# Patient Record
Sex: FEMALE | Race: BLACK OR AFRICAN AMERICAN | Employment: UNEMPLOYED | RURAL
[De-identification: names, ages, dates, MRNs, and addresses within clinical notes are randomized per-mention and may not be internally consistent; named-entity substitution may affect disease eponyms.]

---

## 2017-03-09 DIAGNOSIS — I10 ESSENTIAL HYPERTENSION, BENIGN: ICD-10-CM

## 2017-03-09 RX ORDER — LISINOPRIL AND HYDROCHLOROTHIAZIDE 12.5; 2 MG/1; MG/1
TABLET ORAL
Qty: 60 TAB | Refills: 0 | Status: SHIPPED | OUTPATIENT
Start: 2017-03-09 | End: 2017-04-25 | Stop reason: SDUPTHER

## 2017-03-09 NOTE — LETTER
3/9/2017 1:41 PM 
 
Ms. Flavio Elena [address] [facility] [address] [facility] [address] Dear Ms. Harsha: 
 
We've missed you! Please call our office at [phone] and schedule a follow up appointment for your continued care.  
 
 
 
Sincerely, 
 
 
Sandie Montano NP

## 2017-04-25 ENCOUNTER — OFFICE VISIT (OUTPATIENT)
Dept: FAMILY MEDICINE CLINIC | Age: 67
End: 2017-04-25

## 2017-04-25 VITALS
RESPIRATION RATE: 20 BRPM | DIASTOLIC BLOOD PRESSURE: 87 MMHG | TEMPERATURE: 97.8 F | HEART RATE: 68 BPM | SYSTOLIC BLOOD PRESSURE: 170 MMHG | BODY MASS INDEX: 30.02 KG/M2 | WEIGHT: 159 LBS | HEIGHT: 61 IN

## 2017-04-25 DIAGNOSIS — F41.8 DEPRESSION WITH ANXIETY: Primary | ICD-10-CM

## 2017-04-25 DIAGNOSIS — I10 ESSENTIAL HYPERTENSION, BENIGN: ICD-10-CM

## 2017-04-25 DIAGNOSIS — F20.3 UNDIFFERENTIATED SCHIZOPHRENIA (HCC): ICD-10-CM

## 2017-04-25 DIAGNOSIS — Z00.00 HEALTH CARE MAINTENANCE: ICD-10-CM

## 2017-04-25 RX ORDER — QUETIAPINE FUMARATE 200 MG/1
TABLET, FILM COATED ORAL
Qty: 75 TAB | Refills: 1 | Status: SHIPPED | OUTPATIENT
Start: 2017-04-25 | End: 2017-05-26 | Stop reason: SDUPTHER

## 2017-04-25 RX ORDER — AMLODIPINE BESYLATE 5 MG/1
5 TABLET ORAL DAILY
Qty: 30 TAB | Refills: 0 | Status: CANCELLED | OUTPATIENT
Start: 2017-04-25

## 2017-04-25 RX ORDER — LISINOPRIL AND HYDROCHLOROTHIAZIDE 12.5; 2 MG/1; MG/1
2 TABLET ORAL DAILY
Qty: 60 TAB | Refills: 0 | Status: SHIPPED | OUTPATIENT
Start: 2017-04-25 | End: 2018-09-25 | Stop reason: SDUPTHER

## 2017-04-25 NOTE — MR AVS SNAPSHOT
Visit Information Date & Time Provider Department Dept. Phone Encounter #  
 4/25/2017  3:40 PM Ursula Can MD [address] [place] [address] [id number] Follow-up Instructions Return for -Due for a Medicare Wellness Visit. Upcoming Health Maintenance Date Due Hepatitis C Screening 1950 ZOSTER VACCINE AGE 60> 6/14/2010 GLAUCOMA SCREENING Q2Y 6/14/2015 OSTEOPOROSIS SCREENING (DEXA) 6/14/2015 Pneumococcal 65+ Low/Medium Risk (1 of 2 - PCV13) 6/14/2015 FOBT Q 1 YEAR AGE 50-75 7/16/2015 BREAST CANCER SCRN MAMMOGRAM 7/16/2016 INFLUENZA AGE 9 TO ADULT 8/1/2016 MEDICARE YEARLY EXAM 3/8/2017 DTaP/Tdap/Td series (2 - Td) 3/7/2026 Allergies as of 4/25/2017  Review Complete On: 4/25/2017 By: Kwame Almazan LPN Severity Noted Reaction Type Reactions Aspirin  03/29/2010    Nausea Only Current Immunizations  Never Reviewed Name Date Influenza Vaccine 3/7/2016, 10/28/2013 Tdap 3/7/2016 Not reviewed this visit You Were Diagnosed With   
  
 Codes Comments Health care maintenance    -  Primary ICD-10-CM: Z00.00 ICD-9-CM: V70.0 Essential hypertension, benign     ICD-10-CM: I10 
ICD-9-CM: 401.1 Depression with anxiety     ICD-10-CM: F41.8 ICD-9-CM: 300.4 Undifferentiated schizophrenia ([address]     ICD-10-CM: F20.3 ICD-9-CM: 295.90 Vitals BP Pulse Temp Resp Height(growth percentile) Weight(growth percentile) 170/87 (BP 1 Location: Left arm, BP Patient Position: Sitting) 68 97.8 °F (36.6 °C) (Oral) 20 5' 1\" (1.549 m) 159 lb (72.1 kg) BMI OB Status Smoking Status 30.04 kg/m2 Postmenopausal Current Some Day Smoker Vitals History BMI and BSA Data Body Mass Index Body Surface Area 30.04 kg/m 2 1.76 m 2 Preferred Pharmacy Pharmacy Name Phone [facility],[address], [address] [place] [address] Your Updated Medication List  
  
   
This list is accurate as of: 4/25/17  4:50 PM.  Always use your most recent med list.  
  
  
  
  
 acetaminophen 650 mg CR tablet Commonly known as:  TYLENOL ARTHRITIS PAIN Take 1 Tab by mouth every eight (8) hours as needed for Pain. amLODIPine 5 mg tablet Commonly known as:  Sharion [place] Take 1 Tab by mouth daily. buPROPion  mg SR tablet Commonly known as:  [place] Take 1 Tab by mouth daily. lisinopril-hydroCHLOROthiazide 20-12.5 mg per tablet Commonly known as:  Rad Erin Take 2 Tabs by mouth daily. naproxen 500 mg tablet Commonly known as:  NAPROSYN  
TAKE 1 TAB BY MOUTH TWO (2) TIMES DAILY (WITH MEALS). QUEtiapine 200 mg tablet Commonly known as:  SEROquel Take 1 tablet by mouth in the morning and take 1 1/2 tabs by mouth at bedtime. Prescriptions Sent to Pharmacy Refills  
 lisinopril-hydroCHLOROthiazide (PRINZIDE, ZESTORETIC) 20-12.5 mg per tablet 0 Sig: Take 2 Tabs by mouth daily. Class: Normal  
 Pharmacy: [address] Ph #: [phone] Route: Oral  
 QUEtiapine (SEROQUEL) 200 mg tablet 1 Sig: Take 1 tablet by mouth in the morning and take 1 1/2 tabs by mouth at bedtime. Class: Normal  
 Pharmacy: [address] Ph #: [phone] Follow-up Instructions Return for -Due for a Medicare Wellness Visit. Patient Instructions Please call the clinic or the Suicide Hotline if you start to feel down or think about hurting yourself. We're here for you!!! If you don't get a call back right away, hang on, we'll be calling soon. Please call clinic if you get dizzy or have low blood pressure. Introducing [facility] SERVICES!    
 Mandy Kilpatrick introduces [employer] patient portal. Now you can access parts of your medical record, email your doctor's office, and request medication refills online. 1. In your internet browser, go to [email or web address] 2. Click on the First Time User? Click Here link in the Sign In box. You will see the New Member Sign Up page. 3. Enter your [employer] Access Code exactly as it appears below. You will not need to use this code after youve completed the sign-up process. If you do not sign up before the expiration date, you must request a new code. · [employer] Access Code: [email or web address] Expires: 7/24/2017  4:47 PM 
 
4. Enter the last four digits of your Social Security Number (xxxx) and Date of Birth (mm/dd/yyyy) as indicated and click Submit. You will be taken to the next sign-up page. 5. Create a [employer] ID. This will be your [employer] login ID and cannot be changed, so think of one that is secure and easy to remember. 6. Create a [employer] password. You can change your password at any time. 7. Enter your Password Reset Question and Answer. This can be used at a later time if you forget your password. 8. Enter your e-mail address. You will receive e-mail notification when new information is available in [address] 9. Click Sign Up. You can now view and download portions of your medical record. 10. Click the Download Summary menu link to download a portable copy of your medical information. If you have questions, please visit the Frequently Asked Questions section of the [employer] website. Remember, [employer] is NOT to be used for urgent needs. For medical emergencies, dial 911. Now available from your iPhone and Android! Please provide this summary of care documentation to your next provider. Your primary care clinician is listed as [facility]. If you have any questions after today's visit, please call [phone].

## 2017-04-25 NOTE — PROGRESS NOTES
Reviewed record in preparation for visit and have necessary documentation  Pt did not bring medication to office visit for review  Information was given to pt on Advanced Directives, Living Will  Information was given on Shingles Vaccine  opportunity was given for questions  Goals that were addressed and/or need to be completed during or after this appointment include   Health Maintenance Due   Topic Date Due    Hepatitis C Screening  1950    ZOSTER VACCINE AGE 60>  06/14/2010    GLAUCOMA SCREENING Q2Y  06/14/2015    OSTEOPOROSIS SCREENING (DEXA)  06/14/2015    Pneumococcal 65+ Low/Medium Risk (1 of 2 - PCV13) 06/14/2015    FOBT Q 1 YEAR AGE 50-75  07/16/2015    BREAST CANCER SCRN MAMMOGRAM  07/16/2016    INFLUENZA AGE 9 TO ADULT  08/01/2016    MEDICARE YEARLY EXAM  03/08/2017

## 2017-04-25 NOTE — PATIENT INSTRUCTIONS
Please call the clinic or the Suicide Hotline if you start to feel down or think about hurting yourself. We're here for you!!! If you don't get a call back right away, hang on, we'll be calling soon. Please call clinic if you get dizzy or have low blood pressure.

## 2017-04-30 NOTE — PROGRESS NOTES
Progress Note    Patient: Chelsy Matamoros MRN: [id number]  SSN: [id number]    YOB: 1950  Age: 77 y.o. Sex: female        Chief Complaint   Patient presents with    Hypertension    Depression         Subjective:     Patient presents for medication refill. She has been out of her meds for months. Re: HTN, no HA, no SOB, no CP, no orthopnea, no leg swelling. Review of prior BPs shows was 140/75 when seen 1 year ago in our clinic. She is not taking BP meds currently, doesn't check pressure at home. Regarding depression, she has not seen a psychiatrist in a long time. She lives in [place] but gets her care here because this is where her children live and she sees them very frequently. Most of her social life is in [facility]. She says she has been sad for the last few weeks and wants to restart her seroquel. Moderate level of sadness. Denies hearing voices, seeing things. Denies wanting to hurt herself. She says everything was better when she was on the seroquel. Current and past medical information:    Current Medications after this visit[de-identified]     Current Outpatient Prescriptions   Medication Sig    lisinopril-hydroCHLOROthiazide (PRINZIDE, ZESTORETIC) 20-12.5 mg per tablet Take 2 Tabs by mouth daily.  QUEtiapine (SEROQUEL) 200 mg tablet Take 1 tablet by mouth in the morning and take 1 1/2 tabs by mouth at bedtime.  acetaminophen (TYLENOL ARTHRITIS PAIN) 650 mg CR tablet Take 1 Tab by mouth every eight (8) hours as needed for Pain.  amLODIPine (NORVASC) 5 mg tablet Take 1 Tab by mouth daily.  buPROPion SR (WELLBUTRIN SR) 100 mg SR tablet Take 1 Tab by mouth daily.  naproxen (NAPROSYN) 500 mg tablet TAKE 1 TAB BY MOUTH TWO (2) TIMES DAILY (WITH MEALS). No current facility-administered medications for this visit.         Patient Active Problem List    Diagnosis Date Noted    Knee pain 05/28/2013    Tobacco abuse 05/23/2012    Essential hypertension, benign 09/13/2010    Depression 09/13/2010       Past Medical History:   Diagnosis Date    Depression     Hypertension        Allergies   Allergen Reactions    Aspirin Nausea Only       History reviewed. No pertinent surgical history. Social History     Social History    Marital status:      Spouse name: N/A    Number of children: N/A    Years of education: N/A     Social History Main Topics    Smoking status: Current Some Day Smoker     Packs/day: 0.25     Years: 30.00    Smokeless tobacco: Never Used    Alcohol use No    Drug use: No    Sexual activity: Not Asked     Other Topics Concern    None     Social History Narrative       Review of Systems   Constitutional: Negative for chills and fever. Cardiovascular: Negative for chest pain and palpitations. Objective:     Vitals:    04/25/17 1532   BP: 170/87   Pulse: 68   Resp: 20   Temp: 97.8 °F (36.6 °C)   TempSrc: Oral   Weight: 159 lb (72.1 kg)   Height: 5' 1\" (1.549 m)      Body mass index is 30.04 kg/(m^2). Physical Exam   Constitutional: She is oriented to person, place, and time. No distress. HENT:   Head: Normocephalic and atraumatic. Eyes: Pupils are equal, round, and reactive to light. Right eye exhibits no discharge. Left eye exhibits no discharge. No scleral icterus. Cardiovascular: Normal rate and regular rhythm. Exam reveals no gallop and no friction rub. No murmur heard. Pulmonary/Chest: Effort normal. No respiratory distress. She has no wheezes. She has no rales. Abdominal: Soft. She exhibits no distension. There is no tenderness. Musculoskeletal: She exhibits no edema or tenderness. Lymphadenopathy:     She has no cervical adenopathy. Neurological: She is alert and oriented to person, place, and time. Skin: Skin is warm and dry. No rash noted. She is not diaphoretic. Psychiatric: She has a normal mood and affect. Judgment normal.   Mostly avoids eye contact.   Denies SI, HI, notes good support system. She says she would not hurt herself, although she acknowledged one episode in the past.     Nursing note and vitals reviewed. Assessment and Plan:       ICD-10-CM ICD-9-CM    1. Depression with anxiety F41.8 300.4 QUEtiapine (SEROQUEL) 200 mg tablet   2. Essential hypertension, benign I10 401.1 lisinopril-hydroCHLOROthiazide (PRINZIDE, ZESTORETIC) 20-12.5 mg per tablet   3. Undifferentiated schizophrenia (HCC) F20.3 295.90 QUEtiapine (SEROQUEL) 200 mg tablet   4. Health care maintenance Z00.00 V70.0      Worsening of HTN and depressive symptoms. Not on prescribed therapy. We will restart previous medicines. She will need re-eval for BP control and check up on mood. She agrees to follow up in 2 weeks. She agrees to close follow up with our clinic. Given info on suicide hotline. Plan of care:  Discussed diagnoses in detail with patient. Medication risks/benefits/side effects discussed with patient. All of the patient's questions were addressed. The patient understands and agrees with our plan of care. The patient knows to call back if they are unsure of or forget any changes we discussed today or if the symptoms change. The patient received an After-Visit Summary which contains VS, orders, medication list and allergy list. This can be used as a \"mini-medical record\" should they have to seek medical care while out of town. Patient Care Team:  Marsha Vyas MD as PCP - General (Family Practice)  Deshawn Jones MD (Orthopedic Surgery)    Follow-up Disposition:  Return for -Due for a Medicare Wellness Visit. No future appointments.     Signed By: Ken Santiago MD

## 2017-05-09 ENCOUNTER — OFFICE VISIT (OUTPATIENT)
Dept: FAMILY MEDICINE CLINIC | Age: 67
End: 2017-05-09

## 2017-05-09 VITALS
BODY MASS INDEX: 29.83 KG/M2 | DIASTOLIC BLOOD PRESSURE: 82 MMHG | OXYGEN SATURATION: 99 % | SYSTOLIC BLOOD PRESSURE: 175 MMHG | WEIGHT: 158 LBS | HEART RATE: 68 BPM | TEMPERATURE: 97.4 F | HEIGHT: 61 IN

## 2017-05-09 DIAGNOSIS — F33.1 MODERATE EPISODE OF RECURRENT MAJOR DEPRESSIVE DISORDER (HCC): Primary | ICD-10-CM

## 2017-05-09 DIAGNOSIS — I10 ESSENTIAL HYPERTENSION, BENIGN: ICD-10-CM

## 2017-05-09 RX ORDER — AMLODIPINE BESYLATE 5 MG/1
5 TABLET ORAL DAILY
Qty: 30 TAB | Refills: 3 | Status: SHIPPED | OUTPATIENT
Start: 2017-05-09 | End: 2018-09-25 | Stop reason: SDUPTHER

## 2017-05-09 NOTE — MR AVS SNAPSHOT
Visit Information Date & Time Provider Department Dept. Phone Encounter #  
 5/9/2017 10:30 AM Rachel Fabian MD [address] [place] [address] [id number] Follow-up Instructions Return for Due for Medicare Wellness. Upcoming Health Maintenance Date Due Hepatitis C Screening 1950 ZOSTER VACCINE AGE 60> 6/14/2010 GLAUCOMA SCREENING Q2Y 6/14/2015 OSTEOPOROSIS SCREENING (DEXA) 6/14/2015 Pneumococcal 65+ Low/Medium Risk (1 of 2 - PCV13) 6/14/2015 FOBT Q 1 YEAR AGE 50-75 7/16/2015 BREAST CANCER SCRN MAMMOGRAM 7/16/2016 MEDICARE YEARLY EXAM 3/8/2017 INFLUENZA AGE 9 TO ADULT 8/1/2017 DTaP/Tdap/Td series (2 - Td) 3/7/2026 Allergies as of 5/9/2017  Review Complete On: 5/9/2017 By: Jami Lazar RN Severity Noted Reaction Type Reactions Aspirin  03/29/2010    Nausea Only Current Immunizations  Never Reviewed Name Date Influenza Vaccine 3/7/2016, 10/28/2013 Tdap 3/7/2016 Not reviewed this visit You Were Diagnosed With   
  
 Codes Comments Essential hypertension, benign     ICD-10-CM: I10 
ICD-9-CM: 401.1 Vitals BP Pulse Temp Height(growth percentile) Weight(growth percentile) SpO2  
 175/82 (BP 1 Location: Left arm, BP Patient Position: Sitting) 68 97.4 °F (36.3 °C) (Oral) 5' 1\" (1.549 m) 158 lb (71.7 kg) 99% BMI OB Status Smoking Status 29.85 kg/m2 Postmenopausal Current Some Day Smoker Vitals History BMI and BSA Data Body Mass Index Body Surface Area  
 29.85 kg/m 2 1.76 m 2 Preferred Pharmacy Pharmacy Name Phone [facility], [address] Your Updated Medication List  
  
   
This list is accurate as of: 5/9/17 11:58 AM.  Always use your most recent med list.  
  
  
  
  
 acetaminophen 650 mg CR tablet Commonly known as:  TYLENOL ARTHRITIS PAIN  
 Take 1 Tab by mouth every eight (8) hours as needed for Pain. amLODIPine 5 mg tablet Commonly known as:  [place] Rafter Take 1 Tab by mouth daily. buPROPion  mg SR tablet Commonly known as:  Amyte Tonny Take 1 Tab by mouth daily. lisinopril-hydroCHLOROthiazide 20-12.5 mg per tablet Commonly known as:  Natha Mitch Take 2 Tabs by mouth daily. naproxen 500 mg tablet Commonly known as:  NAPROSYN  
TAKE 1 TAB BY MOUTH TWO (2) TIMES DAILY (WITH MEALS). QUEtiapine 200 mg tablet Commonly known as:  SEROquel Take 1 tablet by mouth in the morning and take 1 1/2 tabs by mouth at bedtime. Prescriptions Sent to Pharmacy Refills  
 amLODIPine (NORVASC) 5 mg tablet 3 Sig: Take 1 Tab by mouth daily. Class: Normal  
 Pharmacy: [address] #: [phone] Route: Oral  
  
We Performed the Following LIPID PANEL [13822 CPT(R)] METABOLIC PANEL, COMPREHENSIVE [69423 CPT(R)] Follow-up Instructions Return for Due for Medicare Wellness. Patient Instructions DASH Diet: Care Instructions Your Care Instructions The DASH diet is an eating plan that can help lower your blood pressure. DASH stands for Dietary Approaches to Stop Hypertension. Hypertension is high blood pressure. The DASH diet focuses on eating foods that are high in calcium, potassium, and magnesium. These nutrients can lower blood pressure. The foods that are highest in these nutrients are fruits, vegetables, low-fat dairy products, nuts, seeds, and legumes. But taking calcium, potassium, and magnesium supplements instead of eating foods that are high in those nutrients does not have the same effect. The DASH diet also includes whole grains, fish, and poultry. The DASH diet is one of several lifestyle changes your doctor may recommend to lower your high blood pressure.  Your doctor may also want you to decrease the amount of sodium in your diet. Lowering sodium while following the DASH diet can lower blood pressure even further than just the DASH diet alone. Follow-up care is a key part of your treatment and safety. Be sure to make and go to all appointments, and call your doctor if you are having problems. It's also a good idea to know your test results and keep a list of the medicines you take. How can you care for yourself at home? Following the DASH diet · Eat 4 to 5 servings of fruit each day. A serving is 1 medium-sized piece of fruit, ½ cup chopped or canned fruit, 1/4 cup dried fruit, or 4 ounces (½ cup) of fruit juice. Choose fruit more often than fruit juice. · Eat 4 to 5 servings of vegetables each day. A serving is 1 cup of lettuce or raw leafy vegetables, ½ cup of chopped or cooked vegetables, or 4 ounces (½ cup) of vegetable juice. Choose vegetables more often than vegetable juice. · Get 2 to 3 servings of low-fat and fat-free dairy each day. A serving is 8 ounces of milk, 1 cup of yogurt, or 1 ½ ounces of cheese. · Eat 6 to 8 servings of grains each day. A serving is 1 slice of bread, 1 ounce of dry cereal, or ½ cup of cooked rice, pasta, or cooked cereal. Try to choose whole-grain products as much as possible. · Limit lean meat, poultry, and fish to 2 servings each day. A serving is 3 ounces, about the size of a deck of cards. · Eat 4 to 5 servings of nuts, seeds, and legumes (cooked dried beans, lentils, and split peas) each week. A serving is 1/3 cup of nuts, 2 tablespoons of seeds, or ½ cup of cooked beans or peas. · Limit fats and oils to 2 to 3 servings each day. A serving is 1 teaspoon of vegetable oil or 2 tablespoons of salad dressing. · Limit sweets and added sugars to 5 servings or less a week. A serving is 1 tablespoon jelly or jam, ½ cup sorbet, or 1 cup of lemonade. · Eat less than 2,300 milligrams (mg) of sodium a day.  If you limit your sodium to 1,500 mg a day, you can lower your blood pressure even more. Tips for success · Start small. Do not try to make dramatic changes to your diet all at once. You might feel that you are missing out on your favorite foods and then be more likely to not follow the plan. Make small changes, and stick with them. Once those changes become habit, add a few more changes. · Try some of the following: ¨ Make it a goal to eat a fruit or vegetable at every meal and at snacks. This will make it easy to get the recommended amount of fruits and vegetables each day. ¨ Try yogurt topped with fruit and nuts for a snack or healthy dessert. ¨ Add lettuce, tomato, cucumber, and onion to sandwiches. ¨ Combine a ready-made pizza crust with low-fat mozzarella cheese and lots of vegetable toppings. Try using tomatoes, squash, spinach, broccoli, carrots, cauliflower, and onions. ¨ Have a variety of cut-up vegetables with a low-fat dip as an appetizer instead of chips and dip. ¨ Sprinkle sunflower seeds or chopped almonds over salads. Or try adding chopped walnuts or almonds to cooked vegetables. ¨ Try some vegetarian meals using beans and peas. Add garbanzo or kidney beans to salads. Make burritos and tacos with mashed salguero beans or black beans. Where can you learn more? Go to http://kavin-skye.info/. Enter C672 in the search box to learn more about \"DASH Diet: Care Instructions. \" Current as of: March 23, 2016 Content Version: 11.2 © 9336-1978 [employer]. Care instructions adapted under license by [employer] (which disclaims liability or warranty for this information). If you have questions about a medical condition or this instruction, always ask your healthcare professional. [address]  any warranty or liability for your use of this information. Introducing [facility] & HEALTH SERVICES! [facility] introduces [employer] patient portal. Now you can access parts of your medical record, email your doctor's office, and request medication refills online. 1. In your internet browser, go to [email or web address] 2. Click on the First Time User? Click Here link in the Sign In box. You will see the New Member Sign Up page. 3. Enter your [employer] Access Code exactly as it appears below. You will not need to use this code after youve completed the sign-up process. If you do not sign up before the expiration date, you must request a new code. · [employer] Access Code: [email or web address] Expires: 7/24/2017  4:47 PM 
 
4. Enter the last four digits of your Social Security Number (xxxx) and Date of Birth (mm/dd/yyyy) as indicated and click Submit. You will be taken to the next sign-up page. 5. Create a [employer] ID. This will be your [employer] login ID and cannot be changed, so think of one that is secure and easy to remember. 6. Create a [employer] password. You can change your password at any time. 7. Enter your Password Reset Question and Answer. This can be used at a later time if you forget your password. 8. Enter your e-mail address. You will receive e-mail notification when new information is available in [address] 9. Click Sign Up. You can now view and download portions of your medical record. 10. Click the Download Summary menu link to download a portable copy of your medical information. If you have questions, please visit the Frequently Asked Questions section of the [employer] website. Remember, [employer] is NOT to be used for urgent needs. For medical emergencies, dial 911. Now available from your iPhone and Android! Please provide this summary of care documentation to your next provider. Your primary care clinician is listed as Joanne Sol. If you have any questions after today's visit, please call [phone].

## 2017-05-09 NOTE — PROGRESS NOTES
Reviewed record in preparation for visit and have necessary documentation  Pt did not bring medication to office visit for review  Information was given to pt on Advanced Directives, Living Will  opportunity was given for questions  Goals that were addressed and/or need to be completed during or after this appointment include     Health Maintenance Due   Topic Date Due    Hepatitis C Screening  1950    ZOSTER VACCINE AGE 60>  06/14/2010    GLAUCOMA SCREENING Q2Y  06/14/2015    OSTEOPOROSIS SCREENING (DEXA)  06/14/2015    Pneumococcal 65+ Low/Medium Risk (1 of 2 - PCV13) 06/14/2015    FOBT Q 1 YEAR AGE 50-75  07/16/2015    BREAST CANCER SCRN MAMMOGRAM  07/16/2016    MEDICARE YEARLY EXAM  03/08/2017

## 2017-05-09 NOTE — PATIENT INSTRUCTIONS

## 2017-05-09 NOTE — PROGRESS NOTES
Pt contact info:  [phone]      Progress Note    Patient: Deion Bhatia MRN: [id number]  SSN: [id number]    YOB: 1950  Age: 77 y.o. Sex: female        Chief Complaint   Patient presents with    Hypertension         Subjective:     F/u for HTN and depression. She started back on her lisinopril-HCTZ and tried to get back started on her seroquel, but there was a problem at the pharmacy. Depression is stable. She still has low mood, moderate, more than half of the days. Seroquel made it better in past.  Family is making it better now. No associated suicidal thoughts currently. No HA, vision changes, CP, dyspnea, leg swelling, cough, or orthopnea. Current and past medical information:    Current Medications after this visit[de-identified]     Current Outpatient Prescriptions   Medication Sig    amLODIPine (NORVASC) 5 mg tablet Take 1 Tab by mouth daily.  lisinopril-hydroCHLOROthiazide (PRINZIDE, ZESTORETIC) 20-12.5 mg per tablet Take 2 Tabs by mouth daily.  acetaminophen (TYLENOL ARTHRITIS PAIN) 650 mg CR tablet Take 1 Tab by mouth every eight (8) hours as needed for Pain.  QUEtiapine (SEROQUEL) 200 mg tablet Take 1 tablet by mouth in the morning and take 1 1/2 tabs by mouth at bedtime.  buPROPion SR (WELLBUTRIN SR) 100 mg SR tablet Take 1 Tab by mouth daily.  naproxen (NAPROSYN) 500 mg tablet TAKE 1 TAB BY MOUTH TWO (2) TIMES DAILY (WITH MEALS). No current facility-administered medications for this visit. Patient Active Problem List    Diagnosis Date Noted    Knee pain 05/28/2013    Tobacco abuse 05/23/2012    Essential hypertension, benign 09/13/2010    Depression 09/13/2010       Past Medical History:   Diagnosis Date    Depression     Hypertension        Allergies   Allergen Reactions    Aspirin Nausea Only       History reviewed. No pertinent surgical history.     Social History     Social History    Marital status:      Spouse name: N/A   [facility] Number of children: N/A    Years of education: N/A     Social History Main Topics    Smoking status: Current Some Day Smoker     Packs/day: 0.25     Years: 30.00    Smokeless tobacco: Never Used    Alcohol use No    Drug use: No    Sexual activity: Not Asked     Other Topics Concern    None     Social History Narrative       Review of Systems   Constitutional: Negative for chills and fever. Respiratory: Negative for shortness of breath and wheezing. Cardiovascular: Negative for chest pain, palpitations, orthopnea and leg swelling. Psychiatric/Behavioral: Positive for depression. Negative for hallucinations, substance abuse and suicidal ideas. The patient is not nervous/anxious. Objective:     Vitals:    05/09/17 1048   BP: 175/82   Pulse: 68   Temp: 97.4 °F (36.3 °C)   TempSrc: Oral   SpO2: 99%   Weight: 158 lb (71.7 kg)   Height: 5' 1\" (1.549 m)      Body mass index is 29.85 kg/(m^2). Physical Exam   Constitutional: She is oriented to person, place, and time. No distress. HENT:   Head: Normocephalic and atraumatic. Eyes: Pupils are equal, round, and reactive to light. Right eye exhibits no discharge. Left eye exhibits no discharge. No scleral icterus. Cardiovascular: Normal rate and regular rhythm. Exam reveals no gallop and no friction rub. No murmur heard. Pulmonary/Chest: Effort normal. No respiratory distress. She has no wheezes. She has no rales. Abdominal: Soft. She exhibits no distension. There is no tenderness. Musculoskeletal: She exhibits no edema or tenderness. Lymphadenopathy:     She has no cervical adenopathy. Neurological: She is alert and oriented to person, place, and time. Skin: Skin is warm and dry. No rash noted. She is not diaphoretic. Psychiatric: Her behavior is normal.   Mood = \"OK\", affect flat   Nursing note and vitals reviewed. Assessment and Plan:       ICD-10-CM ICD-9-CM    1.  Moderate episode of recurrent major depressive disorder ([facility] F33.1 296.32    2. Essential hypertension, benign I10 401.1 amLODIPine (NORVASC) 5 mg tablet      LIPID PANEL      METABOLIC PANEL, COMPREHENSIVE     Labs today. May need statin therapy. Adding norvasc. Will call pharmacy about seroquel. Plan of care:  Discussed diagnoses in detail with patient. Medication risks/benefits/side effects discussed with patient. All of the patient's questions were addressed. The patient understands and agrees with our plan of care. The patient knows to call back if they are unsure of or forget any changes we discussed today or if the symptoms change. The patient received an After-Visit Summary which contains VS, orders, medication list and allergy list. This can be used as a \"mini-medical record\" should they have to seek medical care while out of town. Patient Care Team:  Elsie Marino MD as PCP - General (Family Practice)  Hilario Sheppard MD (Orthopedic Surgery)    Follow-up Disposition:  Return for Due for Medicare Wellness. No future appointments.     Signed By: Chloe Dhillon MD     May 9, 2017        Seen with Dr. Debbie Ugalde

## 2017-05-26 DIAGNOSIS — F20.3 UNDIFFERENTIATED SCHIZOPHRENIA (HCC): ICD-10-CM

## 2017-05-26 DIAGNOSIS — F41.8 DEPRESSION WITH ANXIETY: ICD-10-CM

## 2017-05-26 NOTE — TELEPHONE ENCOUNTER
Pt states he never received the SeroQuel after his visit. He only received the  Amlodipine. She has been without it. Please all it into the Pharmacy if at all possible today.  Thanks

## 2017-05-31 RX ORDER — QUETIAPINE FUMARATE 200 MG/1
TABLET, FILM COATED ORAL
Qty: 75 TAB | Refills: 1 | Status: SHIPPED | OUTPATIENT
Start: 2017-05-31 | End: 2018-09-25 | Stop reason: SDUPTHER

## 2018-09-25 ENCOUNTER — OFFICE VISIT (OUTPATIENT)
Dept: FAMILY MEDICINE CLINIC | Age: 68
End: 2018-09-25

## 2018-09-25 VITALS
HEART RATE: 54 BPM | BODY MASS INDEX: 28.77 KG/M2 | DIASTOLIC BLOOD PRESSURE: 78 MMHG | TEMPERATURE: 97 F | HEIGHT: 61 IN | SYSTOLIC BLOOD PRESSURE: 178 MMHG | OXYGEN SATURATION: 99 % | WEIGHT: 152.4 LBS | RESPIRATION RATE: 20 BRPM

## 2018-09-25 DIAGNOSIS — F41.8 DEPRESSION WITH ANXIETY: ICD-10-CM

## 2018-09-25 DIAGNOSIS — Z23 ENCOUNTER FOR IMMUNIZATION: ICD-10-CM

## 2018-09-25 DIAGNOSIS — I10 ESSENTIAL HYPERTENSION: Primary | ICD-10-CM

## 2018-09-25 DIAGNOSIS — F20.3 UNDIFFERENTIATED SCHIZOPHRENIA (HCC): ICD-10-CM

## 2018-09-25 DIAGNOSIS — K21.9 GASTROESOPHAGEAL REFLUX DISEASE WITHOUT ESOPHAGITIS: ICD-10-CM

## 2018-09-25 DIAGNOSIS — Z72.0 TOBACCO ABUSE: ICD-10-CM

## 2018-09-25 RX ORDER — AMLODIPINE BESYLATE 5 MG/1
5 TABLET ORAL DAILY
Qty: 30 TAB | Refills: 3 | Status: SHIPPED | OUTPATIENT
Start: 2018-09-25 | End: 2019-05-22 | Stop reason: SDUPTHER

## 2018-09-25 RX ORDER — LISINOPRIL AND HYDROCHLOROTHIAZIDE 12.5; 2 MG/1; MG/1
2 TABLET ORAL DAILY
Qty: 60 TAB | Refills: 3 | Status: SHIPPED | OUTPATIENT
Start: 2018-09-25 | End: 2019-05-22 | Stop reason: SDUPTHER

## 2018-09-25 RX ORDER — QUETIAPINE FUMARATE 200 MG/1
TABLET, FILM COATED ORAL
Qty: 75 TAB | Refills: 1 | Status: SHIPPED | OUTPATIENT
Start: 2018-09-25 | End: 2018-09-25 | Stop reason: SDUPTHER

## 2018-09-25 RX ORDER — FAMOTIDINE 40 MG/1
40 TABLET, FILM COATED ORAL DAILY
Qty: 30 TAB | Refills: 2 | Status: SHIPPED | OUTPATIENT
Start: 2018-09-25 | End: 2018-12-24 | Stop reason: SDUPTHER

## 2018-09-25 RX ORDER — QUETIAPINE FUMARATE 200 MG/1
TABLET, FILM COATED ORAL
Qty: 75 TAB | Refills: 1 | Status: SHIPPED | OUTPATIENT
Start: 2018-09-25 | End: 2019-05-22 | Stop reason: SDUPTHER

## 2018-09-25 RX ORDER — AMLODIPINE BESYLATE 5 MG/1
5 TABLET ORAL DAILY
Qty: 30 TAB | Refills: 3 | Status: SHIPPED | OUTPATIENT
Start: 2018-09-25 | End: 2018-09-25 | Stop reason: SDUPTHER

## 2018-09-25 RX ORDER — BUPROPION HYDROCHLORIDE 100 MG/1
100 TABLET, EXTENDED RELEASE ORAL DAILY
Qty: 30 TAB | Refills: 6 | Status: SHIPPED | OUTPATIENT
Start: 2018-09-25 | End: 2019-04-30 | Stop reason: SDUPTHER

## 2018-09-25 NOTE — PROGRESS NOTES
[id number] [facility], [address] [place] [address] Affiliated with [address] [place] [address] Note Subjective:  
Destiny Eaton is a 76 y.o. female presents for evaluation of medication refill CC: I need medication refills History provided by patient HPI: 
 
Pt has been lost to follow up for the past year. HTN Pt  has not taken any medication in at least a year. She was previously prescribed norvasc 5mg , prinzide 20-12.5mg daily. Denies headaches, vision changes, changes in speech, chest pain,shortness of breath, or leg swelling. Depression Pt has to make herself get up. Her mom passed away 3 years ago this month and she states her life has been down hill since. She has not taken Wellbutrin 100mg daily , Seroquel 200mg 1.5 qhs. Denies SI , HI. She has had history of passive SI 2 years agob,but she states she now finds strength in prayer. Tobacco abuse Pt quit 3 weeks ago after smoking for a least 30 years. GERD vs chest pain Pt reports a food stuck in her chest sensation with associated nausea. This has been happening more often. She states she wears dentures and is unsure if she chewing her food appropriately . She reports the metallic taste in her mouth,with mild heartburn. She does not take any antiacids. She has never had an EGD. Social History Pt denies etoh use or illicit drug use Pt lives with her baby son in [place], [place]. Current Outpatient Prescriptions on File Prior to Visit Medication Sig Dispense Refill  naproxen (NAPROSYN) 500 mg tablet TAKE 1 TAB BY MOUTH TWO (2) TIMES DAILY (WITH MEALS). 30 Tab 0  
 acetaminophen (TYLENOL ARTHRITIS PAIN) 650 mg CR tablet Take 1 Tab by mouth every eight (8) hours as needed for Pain. 30 Tab 2 No current facility-administered medications on file prior to visit. Past Medical History:  
Diagnosis Date  Depression  Hypertension Social History Social History  Marital status:  Spouse name: N/A  
 Number of children: N/A  
 Years of education: N/A Occupational History  Not on file. Social History Main Topics  Smoking status: Former Smoker Packs/day: 0.25 Years: 30.00 Quit date: 9/1/2018  Smokeless tobacco: Never Used  Alcohol use No  
 Drug use: No  
 Sexual activity: Not on file Other Topics Concern  Not on file Social History Narrative Review of Systems Constitutional: Negative for chills and fever. Respiratory: Negative for cough and hemoptysis. Cardiovascular: Negative for chest pain. Gastrointestinal: Positive for heartburn and nausea. Negative for abdominal pain and vomiting. Genitourinary: Negative for dysuria. Musculoskeletal: Negative for myalgias. Skin: Negative for itching and rash. Neurological: Negative for dizziness, tingling, sensory change, focal weakness and headaches. Psychiatric/Behavioral: Positive for depression. Negative for hallucinations, substance abuse and suicidal ideas. The patient is not nervous/anxious and does not have insomnia. Objective:  
 
Visit Vitals  /78 (BP 1 Location: Left arm, BP Patient Position: Sitting)  Pulse (!) 54  Temp 97 °F (36.1 °C) (Oral)  Resp 20  
 Ht 5' 1\" (1.549 m)  Wt 152 lb 6.4 oz (69.1 kg)  SpO2 99%  BMI 28.8 kg/m2 Physical Exam: 
Physical Exam  
Constitutional: She is oriented to person, place, and time. She appears well-developed and well-nourished. HENT:  
Head: Normocephalic and atraumatic. Eyes: Conjunctivae are normal. Pupils are equal, round, and reactive to light. Right eye exhibits no discharge. Left eye exhibits no discharge. No scleral icterus. Neck: Normal range of motion. Neck supple. Cardiovascular: Normal rate, regular rhythm, normal heart sounds and intact distal pulses. Exam reveals no gallop and no friction rub. No murmur heard. Pulmonary/Chest: Effort normal and breath sounds normal. No respiratory distress. She has no wheezes. She has no rales. She exhibits no tenderness. Abdominal: Soft. Bowel sounds are normal. She exhibits no distension and no mass. There is no tenderness. There is no guarding. Musculoskeletal: Normal range of motion. She exhibits no edema, tenderness or deformity. Neurological: She is alert and oriented to person, place, and time. No cranial nerve deficit. Skin: Skin is warm and dry. No rash noted. No erythema. Nursing note and vitals reviewed. Assessment and orders: ICD-10-CM ICD-9-CM 1. Essential hypertension I10 401.9 lisinopril-hydroCHLOROthiazide (PRINZIDE, ZESTORETIC) 20-12.5 mg per tablet  
   amLODIPine (NORVASC) 5 mg tablet METABOLIC PANEL, COMPREHENSIVE  
   CBC W/O DIFF  
   LIPID PANEL  
   TSH REFLEX TO T4  
2. Gastroesophageal reflux disease without esophagitis K21.9 530.81 famotidine (PEPCID) 40 mg tablet 3. Tobacco abuse Z72.0 305.1 4. Depression with anxiety F41.8 300.4 buPROPion SR (WELLBUTRIN SR) 100 mg SR tablet TSH REFLEX TO T4  
   QUEtiapine (SEROQUEL) 200 mg tablet DISCONTINUED: QUEtiapine (SEROQUEL) 200 mg tablet DISCONTINUED: QUEtiapine (SEROQUEL) 200 mg tablet 5. Undifferentiated schizophrenia ([facility] F20.3 295.90 QUEtiapine (SEROQUEL) 200 mg tablet DISCONTINUED: QUEtiapine (SEROQUEL) 200 mg tablet DISCONTINUED: QUEtiapine (SEROQUEL) 200 mg tablet 6. Encounter for immunization Z23 V03.89 ADMIN INFLUENZA VIRUS VAC INFLUENZA VACCINE INACTIVATED (IIV), SUBUNIT, ADJUVANTED, IM  
   CANCELED: INFLUENZA VIRUS VACCINE, HIGH DOSE SEASONAL, PRESERVATIVE FREE Diagnoses and all orders for this visit: 1. Essential hypertension 
-     lisinopril-hydroCHLOROthiazide (PRINZIDE, ZESTORETIC) 20-12.5 mg per tablet; Take 2 Tabs by mouth daily. -     amLODIPine (NORVASC) 5 mg tablet; Take 1 Tab by mouth daily. -     METABOLIC PANEL, COMPREHENSIVE 
-     CBC W/O DIFF 
-     LIPID PANEL 
-     TSH REFLEX TO T4 
 
2. Gastroesophageal reflux disease without esophagitis 
-     famotidine (PEPCID) 40 mg tablet; Take 1 Tab by mouth daily. 3. Tobacco abuse - The patient was counseled on the dangers of tobacco use, and was advised to quit ( to continue quit). Reviewed strategies to maximize success, including removing cigarettes and smoking materials from environment, stress management, substitution of other forms of reinforcement, support of family/friends and written materials. 4. Depression with anxiety 
-     buPROPion SR (WELLBUTRIN SR) 100 mg SR tablet; Take 1 Tab by mouth daily. 
-     TSH REFLEX TO T4 
-     QUEtiapine (SEROQUEL) 200 mg tablet; Take 1 tablet by mouth in the morning and take 1  tabs by mouth at bedtime. 5. Undifferentiated schizophrenia ([address] -     QUEtiapine (SEROQUEL) 200 mg tablet; Take 1 tablet by mouth in the morning and take 1  tabs by mouth at bedtime. 6. Encounter for immunization -     ADMIN INFLUENZA VIRUS VAC 
-     INFLUENZA VACCINE INACTIVATED (IIV), SUBUNIT, ADJUVANTED, IM Follow-up Disposition: 
Return in about 4 weeks (around 10/23/2018) for with your PCP when you establish care . I have reviewed patient medical and social history and medications. I have reviewed pertinent labs results and other data. I have discussed the diagnosis with the patient and the intended plan as seen in the above orders. The patient has received an after-visit summary and questions were answered concerning future plans. I have discussed medication side effects and warnings with the patient as well. Ness Amador MD 
Resident [facility]IT[facility] 09/25/18

## 2018-09-25 NOTE — MR AVS SNAPSHOT
[address] [place] [address] 
[phone] Patient: Shraddha Villa MRN: [id number] VXO:[id number] Visit Information Date & Time Provider Department Dept. Phone Encounter #  
 9/25/2018 10:30 AM Maliha Kuo MD [address] [place] [address] [id number] Follow-up Instructions Return in about 4 weeks (around 10/23/2018) for with your PCP when you establish care . Upcoming Health Maintenance Date Due Hepatitis C Screening 1950 Shingrix Vaccine Age 50> (1 of 2) 6/14/2000 GLAUCOMA SCREENING Q2Y 6/14/2015 Bone Densitometry (Dexa) Screening 6/14/2015 Pneumococcal 65+ Low/Medium Risk (1 of 2 - PCV13) 6/14/2015 FOBT Q 1 YEAR AGE 50-75 7/16/2015 BREAST CANCER SCRN MAMMOGRAM 7/16/2016 MEDICARE YEARLY EXAM 3/14/2018 Influenza Age 5 to Adult 8/1/2018 DTaP/Tdap/Td series (2 - Td) 3/7/2026 Allergies as of 9/25/2018  Review Complete On: 9/25/2018 By: Reyna Aguilar LPN Severity Noted Reaction Type Reactions Aspirin  03/29/2010    Nausea Only Current Immunizations  Never Reviewed Name Date Influenza Vaccine 3/7/2016, 10/28/2013 Influenza Vaccine (Tri) Adjuvanted 9/25/2018 Tdap 3/7/2016 Not reviewed this visit You Were Diagnosed With   
  
 Codes Comments Essential hypertension    -  Primary ICD-10-CM: I10 
ICD-9-CM: 401.9 Gastroesophageal reflux disease without esophagitis     ICD-10-CM: K21.9 ICD-9-CM: 530.81 Depression, unspecified depression type     ICD-10-CM: F32.9 ICD-9-CM: 309 Tobacco abuse     ICD-10-CM: Z72.0 ICD-9-CM: 305.1 Essential hypertension, benign     ICD-10-CM: I10 
ICD-9-CM: 401.1 Depression with anxiety     ICD-10-CM: F41.8 ICD-9-CM: 300.4 Undifferentiated schizophrenia ([facility] [address]     ICD-10-CM: F20.3 ICD-9-CM: 295.90 Encounter for immunization     ICD-10-CM: B42 ICD-9-CM: V03.89   
 Vitals BP Pulse Temp Resp Height(growth percentile) Weight(growth percentile) 178/78 (BP 1 Location: Left arm, BP Patient Position: Sitting) (!) 54 97 °F (36.1 °C) (Oral) 20 5' 1\" (1.549 m) 152 lb 6.4 oz (69.1 kg) SpO2 BMI OB Status Smoking Status 99% 28.8 kg/m2 Postmenopausal Former Smoker Vitals History BMI and BSA Data Body Mass Index Body Surface Area  
 28.8 kg/m 2 1.72 m 2 Preferred Pharmacy Pharmacy Name Phone [facility]PH[facility]Y #[id number] [facility],[address], [place] [address] [phone] Your Updated Medication List  
  
   
This list is accurate as of 9/25/18 11:52 AM.  Always use your most recent med list.  
  
  
  
  
 acetaminophen 650 mg Tber Commonly known as:  TYLENOL ARTHRITIS PAIN Take 1 Tab by mouth every eight (8) hours as needed for Pain. amLODIPine 5 mg tablet Commonly known as:  [place] Take 1 Tab by mouth daily. buPROPion  mg SR tablet Commonly known as:  Raymondo Folds Take 1 Tab by mouth daily. famotidine 40 mg tablet Commonly known as:  PEPCID Take 1 Tab by mouth daily. lisinopril-hydroCHLOROthiazide 20-12.5 mg per tablet Commonly known as:  Kristine Mcgrath Take 2 Tabs by mouth daily. naproxen 500 mg tablet Commonly known as:  NAPROSYN  
TAKE 1 TAB BY MOUTH TWO (2) TIMES DAILY (WITH MEALS). QUEtiapine 200 mg tablet Commonly known as:  SEROquel Take 1 tablet by mouth in the morning and take 1 1/2 tabs by mouth at bedtime. Prescriptions Sent to Pharmacy Refills  
 lisinopril-hydroCHLOROthiazide (PRINZIDE, ZESTORETIC) 20-12.5 mg per tablet 3 Sig: Take 2 Tabs by mouth daily. Class: Normal  
 [facility] [address] [place] [address] Ph #: [phone] Route: Oral  
 QUEtiapine (SEROQUEL) 200 mg tablet 1 Sig: Take 1 tablet by mouth in the morning and take 1 1/2 tabs by mouth at bedtime. Class: Normal  
 Pharmacy: [id number] [facility],[address], [address] [place] [address] Ph #: [phone]  
 buPROPion SR (WELLBUTRIN SR) 100 mg SR tablet 6 Sig: Take 1 Tab by mouth daily. Class: Normal  
 Pharmacy: [facility], [address] [place] [address] Ph #: [phone] Route: Oral  
 amLODIPine (NORVASC) 5 mg tablet 3 Sig: Take 1 Tab by mouth daily. Class: Normal  
 Pharmacy: [facility], [address] [place] [address] Ph #: [phone] Route: Oral  
 famotidine (PEPCID) 40 mg tablet 2 Sig: Take 1 Tab by mouth daily. Class: Normal  
 Pharmacy: [address] Ph #: [phone] Route: Oral  
  
We Performed the Following ADMIN INFLUENZA VIRUS VAC [ HCPCS] CBC W/O DIFF [45607 CPT(R)] INFLUENZA VACCINE INACTIVATED (IIV), SUBUNIT, ADJUVANTED, IM [id number] CPT(R)] LIPID PANEL [98938 CPT(R)] METABOLIC PANEL, COMPREHENSIVE [47547 CPT(R)] TSH REFLEX TO T4 [01814 CPT(R)] Follow-up Instructions Return in about 4 weeks (around 10/23/2018) for with your PCP when you establish care . Patient Instructions PCP Dr. Elba Guo MD 
Family practice physician in [place], [place] Address: [address] [place] [address] Hours: 8am · Clses 5PM 
Phone: [phone] Dr. Camacho Warren MD 
Doctor in [place], [place] Address: [facility] [address] Phone: [phone] [facility] yolande  Family practice physician in [place], [place] Address: [address] Phone: [phone] Anxiety Disorder: Care Instructions Your Care Instructions Anxiety is a normal reaction to stress. Difficult situations can cause you to have symptoms such as sweaty palms and a nervous feeling. In an anxiety disorder, the symptoms are far more severe. Constant worry, muscle tension, trouble sleeping, nausea and diarrhea, and other symptoms can make normal daily activities difficult or impossible. These symptoms may occur for no reason, and they can affect your work, school, or social life. Medicines, counseling, and self-care can all help. Follow-up care is a key part of your treatment and safety. Be sure to make and go to all appointments, and call your doctor if you are having problems. It's also a good idea to know your test results and keep a list of the medicines you take. How can you care for yourself at home? · Take medicines exactly as directed. Call your doctor if you think you are having a problem with your medicine. · Go to your counseling sessions and follow-up appointments. · Recognize and accept your anxiety. Then, when you are in a situation that makes you anxious, say to yourself, \"This is not an emergency. I feel uncomfortable, but I am not in danger. I can keep going even if I feel anxious. \" · Be kind to your body: ¨ Relieve tension with exercise or a massage. ¨ Get enough rest. 
¨ Avoid alcohol, caffeine, nicotine, and illegal drugs. They can increase your anxiety level and cause sleep problems. ¨ Learn and do relaxation techniques. See below for more about these techniques. · Engage your mind. Get out and do something you enjoy. Go to a funny movie, or take a walk or hike. Plan your day. Having too much or too little to do can make you anxious. · Keep a record of your symptoms. Discuss your fears with a good friend or family member, or join a support group for people with similar problems. Talking to others sometimes relieves stress. · Get involved in social groups, or volunteer to help others.  Being alone sometimes makes things seem worse than they are. · Get at least 30 minutes of exercise on most days of the week to relieve stress. Walking is a good choice. You also may want to do other activities, such as running, swimming, cycling, or playing tennis or team sports. Relaxation techniques Do relaxation exercises 10 to 20 minutes a day. You can play soothing, relaxing music while you do them, if you wish. · Tell others in your house that you are going to do your relaxation exercises. Ask them not to disturb you. · Find a comfortable place, away from all distractions and noise. · Lie down on your back, or sit with your back straight. · Focus on your breathing. Make it slow and steady. · Breathe in through your nose. Breathe out through either your nose or mouth. · Breathe deeply, filling up the area between your navel and your rib cage. Breathe so that your belly goes up and down. · Do not hold your breath. · Breathe like this for 5 to 10 minutes. Notice the feeling of calmness throughout your whole body. As you continue to breathe slowly and deeply, relax by doing the following for another 5 to 10 minutes: · Tighten and relax each muscle group in your body. You can begin at your toes and work your way up to your head. · Imagine your muscle groups relaxing and becoming heavy. · Empty your mind of all thoughts. · Let yourself relax more and more deeply. · Become aware of the state of calmness that surrounds you. · When your relaxation time is over, you can bring yourself back to alertness by moving your fingers and toes and then your hands and feet and then stretching and moving your entire body. Sometimes people fall asleep during relaxation, but they usually wake up shortly afterward. · Always give yourself time to return to full alertness before you drive a car or do anything that might cause an accident if you are not fully alert. Never play a relaxation tape while you drive a car. When should you call for help? Call 911 anytime you think you may need emergency care. For example, call if: 
  · You feel you cannot stop from hurting yourself or someone else.  
[place] the numbers for these national suicide hotlines: 8-194-610-TALK (2-162-049-498.337.2304) and 5-142-FDEXBGW (2-982.103.3145). If you or someone you know talks about suicide or feeling hopeless, get help right away. 
 Watch closely for changes in your health, and be sure to contact your doctor if: 
  · You have anxiety or fear that affects your life.  
  · You have symptoms of anxiety that are new or different from those you had before. Where can you learn more? Go to http://kavinFrontline GmbHskye.info/. Enter P754 in the search box to learn more about \"Anxiety Disorder: Care Instructions. \" Current as of: December 7, 2017 Content Version: 11.7 © 2984-8862 girnarsoft. Care instructions adapted under license by [employer] (which disclaims liability or warranty for this information). If you have questions about a medical condition or this instruction, always ask your healthcare professional. [place] [address] any warranty or liability for your use of this information. Depression Treatment: Care Instructions Your Care Instructions Depression is a condition that affects the way you feel, think, and act. It causes symptoms such as low energy, loss of interest in daily activities, and sadness or grouchiness that goes on for a long time. Depression is very common and affects men and women of all ages. Depression is a medical illness caused by changes in the natural chemicals in your brain. It is not a character flaw, and it does not mean that you are a bad or weak person. It does not mean that you are going crazy. It is important to know that depression can be treated. Medicines, counseling, and self-care can all help.  Many people do not get help because they are embarrassed or think that they will get over the depression on their own. But some people do not get better without treatment. Follow-up care is a key part of your treatment and safety. Be sure to make and go to all appointments, and call your doctor if you are having problems. It's also a good idea to know your test results and keep a list of the medicines you take. How can you care for yourself at home? Learn about antidepressant medicines Antidepressant medicines can improve or end the symptoms of depression. You may need to take the medicine for at least 6 months, and often longer. Keep taking your medicine even if you feel better. If you stop taking it too soon, your symptoms may come back or get worse. You may start to feel better within 1 to 3 weeks of taking antidepressant medicine. But it can take as many as 6 to 8 weeks to see more improvement. Talk to your doctor if you have problems with your medicine or if you do not notice any improvement after 3 weeks. Antidepressants can make you feel tired, dizzy, or nervous. Some people have dry mouth, constipation, headaches, sexual problems, an upset stomach, or diarrhea. Many of these side effects are mild and go away on their own after you take the medicine for a few weeks. Some may last longer. Talk to your doctor if side effects bother you too much. You might be able to try a different medicine. If you are pregnant or breastfeeding, talk to your doctor about what medicines you can take. Learn about counseling In many cases, counseling can work as well as medicines to treat mild to moderate depression. Counseling is done by licensed mental health providers, such as psychologists, social workers, and some types of nurses. It can be done in one-on-one sessions or in a group setting. Many people find group sessions helpful. Cognitive-behavioral therapy is a type of counseling.  In this treatment therapy, you learn how to see and change unhelpful thinking styles that may be adding to your depression. Counseling and medicines often work well when used together. To manage depression · Be physically active. Getting 30 minutes of exercise each day is good for your body and your mind. Begin slowly if it is hard for you to get started. If you already exercise, keep it up. · Plan something pleasant for yourself every day. Include activities that you have enjoyed in the past. 
· Get enough sleep. Talk to your doctor if you have problems sleeping. · Eat a balanced diet. If you do not feel hungry, eat small snacks rather than large meals. · Do not drink alcohol, use illegal drugs, or take medicines that your doctor has not prescribed for you. They may interfere with your treatment. · Spend time with family and friends. It may help to speak openly about your depression with people you trust. 
· Take your medicines exactly as prescribed. Call your doctor if you think you are having a problem with your medicine. · Do not make major life decisions while you are depressed. Depression may change the way you think. You will be able to make better decisions after you feel better. · Think positively. Challenge negative thoughts with statements such as \"I am hopeful\"; \"Things will get better\"; and \"I can ask for the help I need. \" Write down these statements and read them often, even if you don't believe them yet. · Be patient with yourself. It took time for your depression to develop, and it will take time for your symptoms to improve. Do not take on too much or be too hard on yourself. · Learn all you can about depression from written and online materials. · Check out behavioral health classes to learn more about dealing with depression. · Keep the numbers for these national suicide hotlines: 3-121-264-TALK (3-411.907.3527) and 9-085-LUFBHQJ (3-316.820.6655).  If you or someone you know talks about suicide or feeling hopeless, get help right away. When should you call for help? Call 911 anytime you think you may need emergency care. For example, call if: 
  · You feel you cannot stop from hurting yourself or someone else.  
[facility] your doctor now or seek immediate medical care if: 
  · You hear voices.  
  · You feel much more depressed.  
 Watch closely for changes in your health, and be sure to contact your doctor if: 
  · You are having problems with your depression medicine.  
  · You are not getting better as expected. Where can you learn more? Go to http://kavinBluedskye.info/. Enter V226 in the search box to learn more about \"Depression Treatment: Care Instructions. \" Current as of: December 7, 2017 Content Version: 11.7 © 5105-6105 Recondo. Care instructions adapted under license by [employer] (which disclaims liability or warranty for this information). If you have questions about a medical condition or this instruction, always ask your healthcare professional. [place] [address] any warranty or liability for your use of this information. DASH Diet: Care Instructions Your Care Instructions The DASH diet is an eating plan that can help lower your blood pressure. DASH stands for Dietary Approaches to Stop Hypertension. Hypertension is high blood pressure. The DASH diet focuses on eating foods that are high in calcium, potassium, and magnesium. These nutrients can lower blood pressure. The foods that are highest in these nutrients are fruits, vegetables, low-fat dairy products, nuts, seeds, and legumes. But taking calcium, potassium, and magnesium supplements instead of eating foods that are high in those nutrients does not have the same effect. The DASH diet also includes whole grains, fish, and poultry.  
The DASH diet is one of several lifestyle changes your doctor may recommend to lower your high blood pressure. Your doctor may also want you to decrease the amount of sodium in your diet. Lowering sodium while following the DASH diet can lower blood pressure even further than just the DASH diet alone. Follow-up care is a key part of your treatment and safety. Be sure to make and go to all appointments, and call your doctor if you are having problems. It's also a good idea to know your test results and keep a list of the medicines you take. How can you care for yourself at home? Following the DASH diet · Eat 4 to 5 servings of fruit each day. A serving is 1 medium-sized piece of fruit, ½ cup chopped or canned fruit, 1/4 cup dried fruit, or 4 ounces (½ cup) of fruit juice. Choose fruit more often than fruit juice. · Eat 4 to 5 servings of vegetables each day. A serving is 1 cup of lettuce or raw leafy vegetables, ½ cup of chopped or cooked vegetables, or 4 ounces (½ cup) of vegetable juice. Choose vegetables more often than vegetable juice. · Get 2 to 3 servings of low-fat and fat-free dairy each day. A serving is 8 ounces of milk, 1 cup of yogurt, or 1 ½ ounces of cheese. · Eat 6 to 8 servings of grains each day. A serving is 1 slice of bread, 1 ounce of dry cereal, or ½ cup of cooked rice, pasta, or cooked cereal. Try to choose whole-grain products as much as possible. · Limit lean meat, poultry, and fish to 2 servings each day. A serving is 3 ounces, about the size of a deck of cards. · Eat 4 to 5 servings of nuts, seeds, and legumes (cooked dried beans, lentils, and split peas) each week. A serving is 1/3 cup of nuts, 2 tablespoons of seeds, or ½ cup of cooked beans or peas. · Limit fats and oils to 2 to 3 servings each day. A serving is 1 teaspoon of vegetable oil or 2 tablespoons of salad dressing. · Limit sweets and added sugars to 5 servings or less a week. A serving is 1 tablespoon jelly or jam, ½ cup sorbet, or 1 cup of lemonade. · Eat less than 2,300 milligrams (mg) of sodium a day. If you limit your sodium to 1,500 mg a day, you can lower your blood pressure even more. Tips for success · Start small. Do not try to make dramatic changes to your diet all at once. You might feel that you are missing out on your favorite foods and then be more likely to not follow the plan. Make small changes, and stick with them. Once those changes become habit, add a few more changes. · Try some of the following: ¨ Make it a goal to eat a fruit or vegetable at every meal and at snacks. This will make it easy to get the recommended amount of fruits and vegetables each day. ¨ Try yogurt topped with fruit and nuts for a snack or healthy dessert. ¨ Add lettuce, tomato, cucumber, and onion to sandwiches. ¨ Combine a ready-made pizza crust with low-fat mozzarella cheese and lots of vegetable toppings. Try using tomatoes, squash, spinach, broccoli, carrots, cauliflower, and onions. ¨ Have a variety of cut-up vegetables with a low-fat dip as an appetizer instead of chips and dip. ¨ Sprinkle sunflower seeds or chopped almonds over salads. Or try adding chopped walnuts or almonds to cooked vegetables. ¨ Try some vegetarian meals using beans and peas. Add garbanzo or kidney beans to salads. Make burritos and tacos with mashed salguero beans or black beans. Where can you learn more? Go to http://kavin-skye.info/. Enter W709 in the search box to learn more about \"DASH Diet: Care Instructions. \" Current as of: December 6, 2017 Content Version: 11.7 © 3769-7941 UPR-Online. Care instructions adapted under license by Feedsky (which disclaims liability or warranty for this information). If you have questions about a medical condition or this instruction, always ask your healthcare professional. [address] any warranty or liability for your use of this information. Introducing [facility]! [facility] introduces [employer] patient portal. Now you can access parts of your medical record, email your doctor's office, and request medication refills online. 1. In your internet browser, go to [email or web address] 2. Click on the First Time User? Click Here link in the Sign In box. You will see the New Member Sign Up page. 3. Enter your [employer] Access Code exactly as it appears below. You will not need to use this code after youve completed the sign-up process. If you do not sign up before the expiration date, you must request a new code. · [employer] Access Code: [email or web address] Expires: 12/24/2018 10:40 AM 
 
4. Enter the last four digits of your Social Security Number (xxxx) and Date of Birth (mm/dd/yyyy) as indicated and click Submit. You will be taken to the next sign-up page. 5. Create a [employer] ID. This will be your [employer] login ID and cannot be changed, so think of one that is secure and easy to remember. 6. Create a [employer] password. You can change your password at any time. 7. Enter your Password Reset Question and Answer. This can be used at a later time if you forget your password. 8. Enter your e-mail address. You will receive e-mail notification when new information is available in [address] 9. Click Sign Up. You can now view and download portions of your medical record. 10. Click the Download Summary menu link to download a portable copy of your medical information. If you have questions, please visit the Frequently Asked Questions section of the [employer] website. Remember, [employer] is NOT to be used for urgent needs. For medical emergencies, dial 911. Now available from your iPhone and Android! Please provide this summary of care documentation to your next provider. Your primary care clinician is listed as Mily Fischer. If you have any questions after today's visit, please call [phone].

## 2018-09-25 NOTE — PATIENT INSTRUCTIONS
PCP Dr. Merceeds Hester MD 
Family practice physician in [address] Address: [address] [facility] Hours: 8am · Clses 5PM 
Phone: [phone] Dr. Cris Thompson MD 
Doctor in [address] Address: [facility] [address] Phone: [phone] [facility] Family practice physician in [address] Address: [address] Phone: [phone] Anxiety Disorder: Care Instructions Your Care Instructions Anxiety is a normal reaction to stress. Difficult situations can cause you to have symptoms such as sweaty palms and a nervous feeling. In an anxiety disorder, the symptoms are far more severe. Constant worry, muscle tension, trouble sleeping, nausea and diarrhea, and other symptoms can make normal daily activities difficult or impossible. These symptoms may occur for no reason, and they can affect your work, school, or social life. Medicines, counseling, and self-care can all help. Follow-up care is a key part of your treatment and safety. Be sure to make and go to all appointments, and call your doctor if you are having problems. It's also a good idea to know your test results and keep a list of the medicines you take. How can you care for yourself at home? · Take medicines exactly as directed. Call your doctor if you think you are having a problem with your medicine. · Go to your counseling sessions and follow-up appointments. · Recognize and accept your anxiety. Then, when you are in a situation that makes you anxious, say to yourself, \"This is not an emergency. I feel uncomfortable, but I am not in danger. I can keep going even if I feel anxious. \" · Be kind to your body: ¨ Relieve tension with exercise or a massage.  
¨ Get enough rest. 
 ¨ Avoid alcohol, caffeine, nicotine, and illegal drugs. They can increase your anxiety level and cause sleep problems. ¨ Learn and do relaxation techniques. See below for more about these techniques. · Engage your mind. Get out and do something you enjoy. Go to a funny movie, or take a walk or hike. Plan your day. Having too much or too little to do can make you anxious. · Keep a record of your symptoms. Discuss your fears with a good friend or family member, or join a support group for people with similar problems. Talking to others sometimes relieves stress. · Get involved in social groups, or volunteer to help others. Being alone sometimes makes things seem worse than they are. · Get at least 30 minutes of exercise on most days of the week to relieve stress. Walking is a good choice. You also may want to do other activities, such as running, swimming, cycling, or playing tennis or team sports. Relaxation techniques Do relaxation exercises 10 to 20 minutes a day. You can play soothing, relaxing music while you do them, if you wish. · Tell others in your house that you are going to do your relaxation exercises. Ask them not to disturb you. · Find a comfortable place, away from all distractions and noise. · Lie down on your back, or sit with your back straight. · Focus on your breathing. Make it slow and steady. · Breathe in through your nose. Breathe out through either your nose or mouth. · Breathe deeply, filling up the area between your navel and your rib cage. Breathe so that your belly goes up and down. · Do not hold your breath. · Breathe like this for 5 to 10 minutes. Notice the feeling of calmness throughout your whole body. As you continue to breathe slowly and deeply, relax by doing the following for another 5 to 10 minutes: · Tighten and relax each muscle group in your body. You can begin at your toes and work your way up to your head. · Imagine your muscle groups relaxing and becoming heavy. · Empty your mind of all thoughts. · Let yourself relax more and more deeply. · Become aware of the state of calmness that surrounds you. · When your relaxation time is over, you can bring yourself back to alertness by moving your fingers and toes and then your hands and feet and then stretching and moving your entire body. Sometimes people fall asleep during relaxation, but they usually wake up shortly afterward. · Always give yourself time to return to full alertness before you drive a car or do anything that might cause an accident if you are not fully alert. Never play a relaxation tape while you drive a car. When should you call for help? Call 911 anytime you think you may need emergency care. For example, call if: 
  · You feel you cannot stop from hurting yourself or someone else.  
Angeline [place] the numbers for these national suicide hotlines: 2-638-900-TALK (5-179-634-916.612.3062) and 1-995-CZBGVGB (8-799.892.8103). If you or someone you know talks about suicide or feeling hopeless, get help right away. 
 Watch closely for changes in your health, and be sure to contact your doctor if: 
  · You have anxiety or fear that affects your life.  
  · You have symptoms of anxiety that are new or different from those you had before. Where can you learn more? Go to http://kavin-skye.info/. Enter P754 in the search box to learn more about \"Anxiety Disorder: Care Instructions. \" Current as of: December 7, 2017 Content Version: 11.7 © 0700-0704 [employer]. Care instructions adapted under license by [employer] (which disclaims liability or warranty for this information). If you have questions about a medical condition or this instruction, always ask your healthcare professional. [address] any warranty or liability for your use of this information. Depression Treatment: Care Instructions Your Care Instructions Depression is a condition that affects the way you feel, think, and act. It causes symptoms such as low energy, loss of interest in daily activities, and sadness or grouchiness that goes on for a long time. Depression is very common and affects men and women of all ages. Depression is a medical illness caused by changes in the natural chemicals in your brain. It is not a character flaw, and it does not mean that you are a bad or weak person. It does not mean that you are going crazy. It is important to know that depression can be treated. Medicines, counseling, and self-care can all help. Many people do not get help because they are embarrassed or think that they will get over the depression on their own. But some people do not get better without treatment. Follow-up care is a key part of your treatment and safety. Be sure to make and go to all appointments, and call your doctor if you are having problems. It's also a good idea to know your test results and keep a list of the medicines you take. How can you care for yourself at home? Learn about antidepressant medicines Antidepressant medicines can improve or end the symptoms of depression. You may need to take the medicine for at least 6 months, and often longer. Keep taking your medicine even if you feel better. If you stop taking it too soon, your symptoms may come back or get worse. You may start to feel better within 1 to 3 weeks of taking antidepressant medicine. But it can take as many as 6 to 8 weeks to see more improvement. Talk to your doctor if you have problems with your medicine or if you do not notice any improvement after 3 weeks. Antidepressants can make you feel tired, dizzy, or nervous. Some people have dry mouth, constipation, headaches, sexual problems, an upset stomach, or diarrhea. Many of these side effects are mild and go away on their own after you take the medicine for a few weeks.  Some may last longer. Talk to your doctor if side effects bother you too much. You might be able to try a different medicine. If you are pregnant or breastfeeding, talk to your doctor about what medicines you can take. Learn about counseling In many cases, counseling can work as well as medicines to treat mild to moderate depression. Counseling is done by licensed mental health providers, such as psychologists, social workers, and some types of nurses. It can be done in one-on-one sessions or in a group setting. Many people find group sessions helpful. Cognitive-behavioral therapy is a type of counseling. In this treatment therapy, you learn how to see and change unhelpful thinking styles that may be adding to your depression. Counseling and medicines often work well when used together. To manage depression · Be physically active. Getting 30 minutes of exercise each day is good for your body and your mind. Begin slowly if it is hard for you to get started. If you already exercise, keep it up. · Plan something pleasant for yourself every day. Include activities that you have enjoyed in the past. 
· Get enough sleep. Talk to your doctor if you have problems sleeping. · Eat a balanced diet. If you do not feel hungry, eat small snacks rather than large meals. · Do not drink alcohol, use illegal drugs, or take medicines that your doctor has not prescribed for you. They may interfere with your treatment. · Spend time with family and friends. It may help to speak openly about your depression with people you trust. 
· Take your medicines exactly as prescribed. Call your doctor if you think you are having a problem with your medicine. · Do not make major life decisions while you are depressed. Depression may change the way you think. You will be able to make better decisions after you feel better. · Think positively.  Challenge negative thoughts with statements such as \"I am hopeful\"; \"Things will get better\"; and \"I can ask for the help I need. \" Write down these statements and read them often, even if you don't believe them yet. · Be patient with yourself. It took time for your depression to develop, and it will take time for your symptoms to improve. Do not take on too much or be too hard on yourself. · Learn all you can about depression from written and online materials. · Check out behavioral health classes to learn more about dealing with depression. · Keep the numbers for these national suicide hotlines: 0-745-093-TALK (2-357.153.7752) and 1-805-XYPXMRM (5-163.132.5631). If you or someone you know talks about suicide or feeling hopeless, get help right away. When should you call for help? Call 911 anytime you think you may need emergency care. For example, call if: 
  · You feel you cannot stop from hurting yourself or someone else.  
[facility] your doctor now or seek immediate medical care if: 
  · You hear voices.  
  · You feel much more depressed.  
 Watch closely for changes in your health, and be sure to contact your doctor if: 
  · You are having problems with your depression medicine.  
  · You are not getting better as expected. Where can you learn more? Go to http://kavin-skye.info/. Enter M463 in the search box to learn more about \"Depression Treatment: Care Instructions. \" Current as of: December 7, 2017 Content Version: 11.7 © 5582-5725 [employer]. Care instructions adapted under license by [employer] (which disclaims liability or warranty for this information). If you have questions about a medical condition or this instruction, always ask your healthcare professional. [address] any warranty or liability for your use of this information. DASH Diet: Care Instructions Your Care Instructions The DASH diet is an eating plan that can help lower your blood pressure. DASH stands for Dietary Approaches to Stop Hypertension. Hypertension is high blood pressure. The DASH diet focuses on eating foods that are high in calcium, potassium, and magnesium. These nutrients can lower blood pressure. The foods that are highest in these nutrients are fruits, vegetables, low-fat dairy products, nuts, seeds, and legumes. But taking calcium, potassium, and magnesium supplements instead of eating foods that are high in those nutrients does not have the same effect. The DASH diet also includes whole grains, fish, and poultry. The DASH diet is one of several lifestyle changes your doctor may recommend to lower your high blood pressure. Your doctor may also want you to decrease the amount of sodium in your diet. Lowering sodium while following the DASH diet can lower blood pressure even further than just the DASH diet alone. Follow-up care is a key part of your treatment and safety. Be sure to make and go to all appointments, and call your doctor if you are having problems. It's also a good idea to know your test results and keep a list of the medicines you take. How can you care for yourself at home? Following the DASH diet · Eat 4 to 5 servings of fruit each day. A serving is 1 medium-sized piece of fruit, ½ cup chopped or canned fruit, 1/4 cup dried fruit, or 4 ounces (½ cup) of fruit juice. Choose fruit more often than fruit juice. · Eat 4 to 5 servings of vegetables each day. A serving is 1 cup of lettuce or raw leafy vegetables, ½ cup of chopped or cooked vegetables, or 4 ounces (½ cup) of vegetable juice. Choose vegetables more often than vegetable juice. · Get 2 to 3 servings of low-fat and fat-free dairy each day. A serving is 8 ounces of milk, 1 cup of yogurt, or 1 ½ ounces of cheese. · Eat 6 to 8 servings of grains each day.  A serving is 1 slice of bread, 1 ounce of dry cereal, or ½ cup of cooked rice, pasta, or cooked cereal. Try to choose whole-grain products as much as possible. · Limit lean meat, poultry, and fish to 2 servings each day. A serving is 3 ounces, about the size of a deck of cards. · Eat 4 to 5 servings of nuts, seeds, and legumes (cooked dried beans, lentils, and split peas) each week. A serving is 1/3 cup of nuts, 2 tablespoons of seeds, or ½ cup of cooked beans or peas. · Limit fats and oils to 2 to 3 servings each day. A serving is 1 teaspoon of vegetable oil or 2 tablespoons of salad dressing. · Limit sweets and added sugars to 5 servings or less a week. A serving is 1 tablespoon jelly or jam, ½ cup sorbet, or 1 cup of lemonade. · Eat less than 2,300 milligrams (mg) of sodium a day. If you limit your sodium to 1,500 mg a day, you can lower your blood pressure even more. Tips for success · Start small. Do not try to make dramatic changes to your diet all at once. You might feel that you are missing out on your favorite foods and then be more likely to not follow the plan. Make small changes, and stick with them. Once those changes become habit, add a few more changes. · Try some of the following: ¨ Make it a goal to eat a fruit or vegetable at every meal and at snacks. This will make it easy to get the recommended amount of fruits and vegetables each day. ¨ Try yogurt topped with fruit and nuts for a snack or healthy dessert. ¨ Add lettuce, tomato, cucumber, and onion to sandwiches. ¨ Combine a ready-made pizza crust with low-fat mozzarella cheese and lots of vegetable toppings. Try using tomatoes, squash, spinach, broccoli, carrots, cauliflower, and onions. ¨ Have a variety of cut-up vegetables with a low-fat dip as an appetizer instead of chips and dip. ¨ Sprinkle sunflower seeds or chopped almonds over salads. Or try adding chopped walnuts or almonds to cooked vegetables. ¨ Try some vegetarian meals using beans and peas.  Add garbanzo or kidney beans to salads. Make burritos and tacos with mashed salguero beans or black beans. Where can you learn more? Go to http://kavin-skye.info/. Enter Q484 in the search box to learn more about \"DASH Diet: Care Instructions. \" Current as of: December 6, 2017 Content Version: 11.7 © 1182-0399 [employer]. Care instructions adapted under license by [employer] (which disclaims liability or warranty for this information). If you have questions about a medical condition or this instruction, always ask your healthcare professional. [place] [address] any warranty or liability for your use of this information.

## 2018-09-25 NOTE — PROGRESS NOTES
1. Have you been to the ER, urgent care clinic since your last visit? Hospitalized since your last visit? No 
 
2. Have you seen or consulted any other health care providers outside of the [address] [place] [address] since your last visit? Include any pap smears or colon screening. No 
Reviewed record in preparation for visit and have necessary documentation Pt did not bring medication to office visit for review Goals that were addressed and/or need to be completed during or after this appointment include Health Maintenance Due Topic Date Due  
 Hepatitis C Screening  1950  Shingrix Vaccine Age 50> (1 of 2) 06/14/2000  GLAUCOMA SCREENING Q2Y  06/14/2015  Bone Densitometry (Dexa) Screening  06/14/2015  Pneumococcal 65+ Low/Medium Risk (1 of 2 - PCV13) 06/14/2015  FOBT Q 1 YEAR AGE 50-75  07/16/2015  BREAST CANCER SCRN MAMMOGRAM  07/16/2016  MEDICARE YEARLY EXAM  03/14/2018  Influenza Age 5 to Adult  08/01/2018

## 2018-09-26 LAB
ALBUMIN SERPL-MCNC: 4.4 G/DL (ref 3.6–4.8)
ALBUMIN/GLOB SERPL: 1.4 {RATIO} (ref 1.2–2.2)
ALP SERPL-CCNC: 112 IU/L (ref 39–117)
ALT SERPL-CCNC: 15 IU/L (ref 0–32)
AST SERPL-CCNC: 25 IU/L (ref 0–40)
BILIRUB SERPL-MCNC: 0.4 MG/DL (ref 0–1.2)
BUN SERPL-MCNC: 7 MG/DL (ref 8–27)
BUN/CREAT SERPL: 7 (ref 12–28)
CALCIUM SERPL-MCNC: 9.6 MG/DL (ref 8.7–10.3)
CHLORIDE SERPL-SCNC: 104 MMOL/L (ref 96–106)
CHOLEST SERPL-MCNC: 171 MG/DL (ref 100–199)
CO2 SERPL-SCNC: 21 MMOL/L (ref 20–29)
CREAT SERPL-MCNC: 1.02 MG/DL (ref 0.57–1)
ERYTHROCYTE [DISTWIDTH] IN BLOOD BY AUTOMATED COUNT: 14.5 % (ref 12.3–15.4)
GLOBULIN SER CALC-MCNC: 3.2 G/DL (ref 1.5–4.5)
GLUCOSE SERPL-MCNC: 95 MG/DL (ref 65–99)
HCT VFR BLD AUTO: 41.2 % (ref 34–46.6)
HDLC SERPL-MCNC: 57 MG/DL
HGB BLD-MCNC: 14.2 G/DL (ref 11.1–15.9)
LDLC SERPL CALC-MCNC: 97 MG/DL (ref 0–99)
MCH RBC QN AUTO: 31.4 PG (ref 26.6–33)
MCHC RBC AUTO-ENTMCNC: 34.5 G/DL (ref 31.5–35.7)
MCV RBC AUTO: 91 FL (ref 79–97)
PLATELET # BLD AUTO: 317 X10E3/UL (ref 150–379)
POTASSIUM SERPL-SCNC: 4 MMOL/L (ref 3.5–5.2)
PROT SERPL-MCNC: 7.6 G/DL (ref 6–8.5)
RBC # BLD AUTO: 4.52 X10E6/UL (ref 3.77–5.28)
SODIUM SERPL-SCNC: 141 MMOL/L (ref 134–144)
TRIGL SERPL-MCNC: 85 MG/DL (ref 0–149)
TSH SERPL DL<=0.005 MIU/L-ACNC: 0.87 UIU/ML (ref 0.45–4.5)
VLDLC SERPL CALC-MCNC: 17 MG/DL (ref 5–40)
WBC # BLD AUTO: 7.3 X10E3/UL (ref 3.4–10.8)

## 2018-09-26 NOTE — PROGRESS NOTES
I reviewed with the resident the medical history and the resident's findings on the physical examination. I discussed with the resident the patient's diagnosis and concur with the plan. Will make sure she is getting regular follow-up with Psych.

## 2018-11-21 DIAGNOSIS — F41.8 DEPRESSION WITH ANXIETY: ICD-10-CM

## 2018-11-21 DIAGNOSIS — F20.3 UNDIFFERENTIATED SCHIZOPHRENIA (HCC): ICD-10-CM

## 2018-11-21 DIAGNOSIS — K21.9 GASTROESOPHAGEAL REFLUX DISEASE WITHOUT ESOPHAGITIS: ICD-10-CM

## 2018-11-27 RX ORDER — FAMOTIDINE 40 MG/1
40 TABLET, FILM COATED ORAL DAILY
Qty: 30 TAB | Refills: 2 | OUTPATIENT
Start: 2018-11-27

## 2018-11-27 RX ORDER — QUETIAPINE FUMARATE 200 MG/1
TABLET, FILM COATED ORAL
Qty: 75 TAB | Refills: 1 | OUTPATIENT
Start: 2018-11-27

## 2018-11-27 RX ORDER — BUPROPION HYDROCHLORIDE 100 MG/1
100 TABLET, EXTENDED RELEASE ORAL DAILY
Qty: 30 TAB | Refills: 6 | OUTPATIENT
Start: 2018-11-27

## 2018-11-27 NOTE — TELEPHONE ENCOUNTER
Rx request for schizophrenia medications received. Pt was advised at last visit that she needs to have these filled by Psychiatry. If this is not possible for some reason, she will need another appt to discuss this. Could we call and figure this out?

## 2018-11-28 NOTE — TELEPHONE ENCOUNTER
Called made to pt, no answer, mess left. Per Dr. Michelle Branch:     Rx request for schizophrenia medications received. Pt was advised at last visit that she needs to have these filled by Psychiatry. If this is not possible for some reason, she will need another appt to discuss this. Could we call and figure this out?

## 2018-12-24 DIAGNOSIS — K21.9 GASTROESOPHAGEAL REFLUX DISEASE WITHOUT ESOPHAGITIS: ICD-10-CM

## 2018-12-24 RX ORDER — FAMOTIDINE 40 MG/1
40 TABLET, FILM COATED ORAL DAILY
Qty: 30 TAB | Refills: 2 | Status: SHIPPED | OUTPATIENT
Start: 2018-12-24 | End: 2019-05-22 | Stop reason: SDUPTHER

## 2019-04-23 DIAGNOSIS — F41.8 DEPRESSION WITH ANXIETY: ICD-10-CM

## 2019-04-23 RX ORDER — BUPROPION HYDROCHLORIDE 100 MG/1
100 TABLET, EXTENDED RELEASE ORAL DAILY
Qty: 30 TAB | Refills: 6 | Status: CANCELLED | OUTPATIENT
Start: 2019-04-23

## 2019-04-23 RX ORDER — BUPROPION HYDROCHLORIDE 100 MG/1
100 TABLET, EXTENDED RELEASE ORAL DAILY
Qty: 30 TAB | Refills: 6 | OUTPATIENT
Start: 2019-04-23

## 2019-04-30 DIAGNOSIS — F41.8 DEPRESSION WITH ANXIETY: ICD-10-CM

## 2019-04-30 RX ORDER — BUPROPION HYDROCHLORIDE 100 MG/1
100 TABLET, EXTENDED RELEASE ORAL DAILY
Qty: 30 TAB | Refills: 6 | Status: CANCELLED | OUTPATIENT
Start: 2019-04-30

## 2019-04-30 RX ORDER — BUPROPION HYDROCHLORIDE 100 MG/1
100 TABLET, EXTENDED RELEASE ORAL DAILY
Qty: 30 TAB | Refills: 6 | Status: SHIPPED | OUTPATIENT
Start: 2019-04-30 | End: 2019-05-22 | Stop reason: SDUPTHER

## 2019-05-22 ENCOUNTER — OFFICE VISIT (OUTPATIENT)
Dept: FAMILY MEDICINE CLINIC | Age: 69
End: 2019-05-22

## 2019-05-22 VITALS
DIASTOLIC BLOOD PRESSURE: 81 MMHG | BODY MASS INDEX: 32.1 KG/M2 | TEMPERATURE: 97.9 F | HEIGHT: 61 IN | WEIGHT: 170 LBS | SYSTOLIC BLOOD PRESSURE: 157 MMHG | RESPIRATION RATE: 18 BRPM | HEART RATE: 70 BPM | OXYGEN SATURATION: 95 %

## 2019-05-22 DIAGNOSIS — Z79.899 OTHER LONG TERM (CURRENT) DRUG THERAPY: ICD-10-CM

## 2019-05-22 DIAGNOSIS — F41.8 DEPRESSION WITH ANXIETY: ICD-10-CM

## 2019-05-22 DIAGNOSIS — K21.9 GASTROESOPHAGEAL REFLUX DISEASE WITHOUT ESOPHAGITIS: ICD-10-CM

## 2019-05-22 DIAGNOSIS — F20.3 UNDIFFERENTIATED SCHIZOPHRENIA (HCC): ICD-10-CM

## 2019-05-22 DIAGNOSIS — Z13.31 SCREENING FOR DEPRESSION: ICD-10-CM

## 2019-05-22 DIAGNOSIS — I10 ESSENTIAL HYPERTENSION: Primary | ICD-10-CM

## 2019-05-22 DIAGNOSIS — Z13.39 SCREENING FOR ALCOHOLISM: ICD-10-CM

## 2019-05-22 DIAGNOSIS — E11.9 CONTROLLED TYPE 2 DIABETES MELLITUS WITHOUT COMPLICATION, WITHOUT LONG-TERM CURRENT USE OF INSULIN (HCC): ICD-10-CM

## 2019-05-22 DIAGNOSIS — Z00.00 MEDICARE ANNUAL WELLNESS VISIT, SUBSEQUENT: ICD-10-CM

## 2019-05-22 RX ORDER — LISINOPRIL AND HYDROCHLOROTHIAZIDE 12.5; 2 MG/1; MG/1
2 TABLET ORAL DAILY
Qty: 180 TAB | Refills: 1 | Status: SHIPPED | OUTPATIENT
Start: 2019-05-22 | End: 2019-11-17 | Stop reason: SDUPTHER

## 2019-05-22 RX ORDER — AMLODIPINE BESYLATE 5 MG/1
5 TABLET ORAL DAILY
Qty: 90 TAB | Refills: 1 | Status: SHIPPED | OUTPATIENT
Start: 2019-05-22 | End: 2019-11-17 | Stop reason: SDUPTHER

## 2019-05-22 RX ORDER — BUPROPION HYDROCHLORIDE 100 MG/1
100 TABLET, EXTENDED RELEASE ORAL DAILY
Qty: 90 TAB | Refills: 1 | Status: SHIPPED | OUTPATIENT
Start: 2019-05-22

## 2019-05-22 RX ORDER — QUETIAPINE FUMARATE 200 MG/1
TABLET, FILM COATED ORAL
Qty: 180 TAB | Refills: 1 | Status: SHIPPED | OUTPATIENT
Start: 2019-05-22 | End: 2019-11-17 | Stop reason: SDUPTHER

## 2019-05-22 RX ORDER — FAMOTIDINE 40 MG/1
40 TABLET, FILM COATED ORAL DAILY
Qty: 90 TAB | Refills: 1 | Status: SHIPPED | OUTPATIENT
Start: 2019-05-22 | End: 2019-11-17 | Stop reason: SDUPTHER

## 2019-05-22 NOTE — PROGRESS NOTES
1. Have you been to the ER, urgent care clinic since your last visit? Hospitalized since your last visit? No    2. Have you seen or consulted any other health care providers outside of the [address] [place] [address] since your last visit? Include any pap smears or colon screening.  No  Reviewed record in preparation for visit and have necessary documentation  Pt did not bring medication to office visit for review  opportunity was given for questions  Goals that were addressed and/or need to be completed during or after this appointment include    Health Maintenance Due   Topic Date Due    Hepatitis C Screening  1950    Shingrix Vaccine Age 50> (1 of 2) 06/14/2000    GLAUCOMA SCREENING Q2Y  06/14/2015    Bone Densitometry (Dexa) Screening  06/14/2015    Pneumococcal 65+ years (1 of 2 - PCV13) 06/14/2015    FOBT Q 1 YEAR AGE 50-75  07/16/2015    BREAST CANCER SCRN MAMMOGRAM  07/16/2016    MEDICARE YEARLY EXAM  03/14/2018

## 2019-05-23 LAB
ALBUMIN SERPL-MCNC: 3.9 G/DL (ref 3.6–4.8)
ALBUMIN/GLOB SERPL: 1.3 {RATIO} (ref 1.2–2.2)
ALP SERPL-CCNC: 101 IU/L (ref 39–117)
ALT SERPL-CCNC: 14 IU/L (ref 0–32)
AST SERPL-CCNC: 18 IU/L (ref 0–40)
BILIRUB SERPL-MCNC: <0.2 MG/DL (ref 0–1.2)
BUN SERPL-MCNC: 11 MG/DL (ref 8–27)
BUN/CREAT SERPL: 9 (ref 12–28)
CALCIUM SERPL-MCNC: 9.5 MG/DL (ref 8.7–10.3)
CHLORIDE SERPL-SCNC: 108 MMOL/L (ref 96–106)
CO2 SERPL-SCNC: 24 MMOL/L (ref 20–29)
CREAT SERPL-MCNC: 1.25 MG/DL (ref 0.57–1)
GLOBULIN SER CALC-MCNC: 2.9 G/DL (ref 1.5–4.5)
GLUCOSE SERPL-MCNC: 143 MG/DL (ref 65–99)
HCT VFR BLD AUTO: 40.5 % (ref 34–46.6)
HGB BLD-MCNC: 13.9 G/DL (ref 11.1–15.9)
POTASSIUM SERPL-SCNC: 4.3 MMOL/L (ref 3.5–5.2)
PROT SERPL-MCNC: 6.8 G/DL (ref 6–8.5)
SODIUM SERPL-SCNC: 145 MMOL/L (ref 134–144)
TSH SERPL DL<=0.005 MIU/L-ACNC: 0.73 UIU/ML (ref 0.45–4.5)

## 2019-05-27 NOTE — PROGRESS NOTES
Patient: Jane Tinsley MRN: [id number]  SSN: [id number]    YOB: 1950  Age: 76 y.o. Sex: female        Subjective:     Chief Complaint   Patient presents with    Hypertension       HPI: she is a 76y.o. year old female who presents for follow up of her chronic medical conditions. She lives in [place] and returns for medical care here in [place]. Patient with hx of HTN, GERD and psychiatric problems. Patient denies HA, dizziness, SOB, CP, abdominal pain, dysuria, acute myalgias or arthralgias. Patient is need medication refills. Patient is due for lab work. Patient does not have new complaints and/or concerns. HgbA1c diagnostic for T2D. Encounter Diagnoses   Name Primary?  Essential hypertension Yes    Controlled type 2 diabetes mellitus without complication, without long-term current use of insulin ([facility])     Gastroesophageal reflux disease without esophagitis     Depression with anxiety     Undifferentiated schizophrenia ([facility] [address]     Other long term (current) drug therapy      Medicare annual wellness visit, subsequent     Screening for depression     Screening for alcoholism        BP Readings from Last 3 Encounters:   05/22/19 157/81   09/25/18 178/78   05/09/17 175/82       Wt Readings from Last 3 Encounters:   05/22/19 170 lb (77.1 kg)   09/25/18 152 lb 6.4 oz (69.1 kg)   05/09/17 158 lb (71.7 kg)     Body mass index is 32.12 kg/m².     Lab Results   Component Value Date/Time    HGB 13.9 05/22/2019 03:56 PM    HCT 40.5 05/22/2019 03:56 PM     Lab Results   Component Value Date/Time    TSH 0.731 05/22/2019 03:56 PM      Lab Results   Component Value Date/Time    Sodium 145 (H) 05/22/2019 03:56 PM    Potassium 4.3 05/22/2019 03:56 PM    Chloride 108 (H) 05/22/2019 03:56 PM    CO2 24 05/22/2019 03:56 PM    Anion gap 11 09/13/2010 10:47 AM    Glucose 143 (H) 05/22/2019 03:56 PM    BUN 11 05/22/2019 03:56 PM    Creatinine 1.25 (H) 05/22/2019 03:56 PM    BUN/Creatinine ratio 9 (L) 05/22/2019 03:56 PM    GFR est AA 51 (L) 05/22/2019 03:56 PM    GFR est non-AA 44 (L) 05/22/2019 03:56 PM    Calcium 9.5 05/22/2019 03:56 PM    Bilirubin, total <0.2 05/22/2019 03:56 PM    ALT (SGPT) 14 05/22/2019 03:56 PM    AST (SGOT) 18 05/22/2019 03:56 PM    Alk. phosphatase 101 05/22/2019 03:56 PM    Protein, total 6.8 05/22/2019 03:56 PM    Albumin 3.9 05/22/2019 03:56 PM    Globulin 3.8 09/13/2010 10:47 AM    A-G Ratio 1.3 05/22/2019 03:56 PM      Lab Results   Component Value Date/Time    Hemoglobin A1c 6.6 (H) 05/22/2019 03:56 PM          Current and past medical information:    Current Medications after this visit[de-identified]     Current Outpatient Medications   Medication Sig    buPROPion SR (WELLBUTRIN SR) 100 mg SR tablet Take 1 Tab by mouth daily.  famotidine (PEPCID) 40 mg tablet Take 1 Tab by mouth daily.  lisinopril-hydroCHLOROthiazide (PRINZIDE, ZESTORETIC) 20-12.5 mg per tablet Take 2 Tabs by mouth daily.  amLODIPine (NORVASC) 5 mg tablet Take 1 Tab by mouth daily.  QUEtiapine (SEROQUEL) 200 mg tablet Take 1 tablet by mouth in the morning and take 1  tabs by mouth at bedtime.  naproxen (NAPROSYN) 500 mg tablet TAKE 1 TAB BY MOUTH TWO (2) TIMES DAILY (WITH MEALS).  acetaminophen (TYLENOL ARTHRITIS PAIN) 650 mg CR tablet Take 1 Tab by mouth every eight (8) hours as needed for Pain. No current facility-administered medications for this visit. Patient Active Problem List    Diagnosis Date Noted    Knee pain 05/28/2013    Tobacco abuse 05/23/2012    Essential hypertension, benign 09/13/2010    Depression 09/13/2010       Past Medical History:   Diagnosis Date    Depression     Hypertension        Allergies   Allergen Reactions    Aspirin Nausea Only       No past surgical history on file.     Social History     Socioeconomic History    Marital status:      Spouse name: Not on file    Number of children: Not on file    Years of education: Not on file    Highest education level: Not on file   Tobacco Use    Smoking status: Former Smoker     Packs/day: 0.25     Years: 30.00     Pack years: 7.50     Last attempt to quit: 2018     Years since quittin.7    Smokeless tobacco: Never Used   Substance and Sexual Activity    Alcohol use: No    Drug use: No         Objective:     Review of Systems:  Constitutional: Negative for fatigue or malaise  Derm: Negative for rash or lesion  HEENT: Negative for acute hearing or vision changes  Cardiovascular: Negative for dizziness, chest pain or palpitations  Respiratory: Negative for cough, wheezing or SOB  Gastrointestinal: Negative for nausea or abdominal pain  Genital/urinary: Negative for dysuria or voiding dysfunction  Muscoloskeletal: Negative for acute myalgias or arthralgias   Neurological: Negative for headache, weakness or paresthesia  Psychological: see HPI     Vitals:    19 1507   BP: 157/81   Pulse: 70   Resp: 18   Temp: 97.9 °F (36.6 °C)   TempSrc: Oral   SpO2: 95%   Weight: 170 lb (77.1 kg)   Height: 5' 1\" (1.549 m)      Body mass index is 32.12 kg/m². Physical Exam:  Constitutional: well developed, well nourished, in no acute distress  Skin: warm and dry, normal tone and turgor  Head: normocephalic, atraumatic  Eyes: sclera clear, EOMI, PERRL  Neck: normal range of motion  Cardiovascular: normal S1, S2, regular rate and rhythm  Respiratory: clear to auscultation bilaterally with symmetrical effort  Abdomen: soft, BS normal  Extremities: full range of motion, normal gait  Neurology: normal strength and sensation  Psych: active, alert and oriented, flat affect       Assessment and orders:       ICD-10-CM ICD-9-CM    1. Essential hypertension I10 401.9 lisinopril-hydroCHLOROthiazide (PRINZIDE, ZESTORETIC) 20-12.5 mg per tablet      amLODIPine (NORVASC) 5 mg tablet      METABOLIC PANEL, COMPREHENSIVE      TSH 3RD GENERATION      HGB & HCT   2.  Controlled type 2 diabetes mellitus without complication, without long-term current use of insulin (HCC) E11.9 250.00    3. Gastroesophageal reflux disease without esophagitis K21.9 530.81 famotidine (PEPCID) 40 mg tablet   4. Depression with anxiety F41.8 300.4 buPROPion SR (WELLBUTRIN SR) 100 mg SR tablet      QUEtiapine (SEROQUEL) 200 mg tablet   5. Undifferentiated schizophrenia ([address] F20.3 295.90 QUEtiapine (SEROQUEL) 200 mg tablet      HEMOGLOBIN A1C WITH EAG   6. Other long term (current) drug therapy  Z79.899 V58.69 HEMOGLOBIN A1C WITH EAG       Plan of care:  Diagnoses were discussed in detail with patient. Medication risks/benefits/side effects discussed with patient. All of the patient's questions were addressed and answered to apparent satisfaction. The patient understands and agrees with our plan of care. The patient knows to call back if they have questions about the plan of care or if symptoms change. The patient received an After-Visit Summary which contains VS, diagnoses, orders, allergy and medication lists. Patient Care Team:  Hue Walsh MD as PCP - General (Family Practice)  Jacinta Izaguirre MD (Orthopedic Surgery)    Follow-up and Dispositions    · Return in about 3 months (around 8/22/2019), or if symptoms worsen or fail to improve. Future Appointments   Date Time Provider Judith Gonzales   9/25/2019 11:00 AM Sara Henao MD [facility] [place] SCHED       Signed By: Adry Davidson MD     May 27, 2019      The following Annual Medicare Wellness Exam is distinct and separate from the medical evaluation and decision making. This is the Subsequent Medicare Annual Wellness Exam, performed 12 months or more after the Initial AWV or the last Subsequent AWV    I have reviewed the patient's medical history in detail and updated the computerized patient record. History     Past Medical History:   Diagnosis Date    Depression     Hypertension       No past surgical history on file.   Current Outpatient Medications   Medication Sig Dispense Refill    buPROPion SR (WELLBUTRIN SR) 100 mg SR tablet Take 1 Tab by mouth daily. 90 Tab 1    famotidine (PEPCID) 40 mg tablet Take 1 Tab by mouth daily. 90 Tab 1    lisinopril-hydroCHLOROthiazide (PRINZIDE, ZESTORETIC) 20-12.5 mg per tablet Take 2 Tabs by mouth daily. 180 Tab 1    amLODIPine (NORVASC) 5 mg tablet Take 1 Tab by mouth daily. 90 Tab 1    QUEtiapine (SEROQUEL) 200 mg tablet Take 1 tablet by mouth in the morning and take 1  tabs by mouth at bedtime. 180 Tab 1    naproxen (NAPROSYN) 500 mg tablet TAKE 1 TAB BY MOUTH TWO (2) TIMES DAILY (WITH MEALS). 30 Tab 0    acetaminophen (TYLENOL ARTHRITIS PAIN) 650 mg CR tablet Take 1 Tab by mouth every eight (8) hours as needed for Pain. 30 Tab 2     Allergies   Allergen Reactions    Aspirin Nausea Only     Family History   Problem Relation Age of Onset    Cancer Father         lung ca    Lung Disease Father     Cancer Brother     Alcohol abuse Neg Hx     Arthritis-rheumatoid Neg Hx     Asthma Neg Hx     Bleeding Prob Neg Hx     Diabetes Neg Hx     Elevated Lipids Neg Hx     Headache Neg Hx     Heart Disease Neg Hx     Hypertension Neg Hx     Migraines Neg Hx     Psychiatric Disorder Neg Hx     Stroke Neg Hx     Mental Retardation Neg Hx      Social History     Tobacco Use    Smoking status: Former Smoker     Packs/day: 0.25     Years: 30.00     Pack years: 7.50     Last attempt to quit: 2018     Years since quittin.7    Smokeless tobacco: Never Used   Substance Use Topics    Alcohol use: No     Patient Active Problem List   Diagnosis Code    Essential hypertension, benign I10    Depression F32.9    Tobacco abuse Z72.0    Knee pain M25.569       Depression Risk Factor Screening:     3 most recent PHQ Screens 2018   Little interest or pleasure in doing things Several days   Feeling down, depressed, irritable, or hopeless Several days   Total Score PHQ 2 2     Alcohol Risk Factor Screening:    You do not drink alcohol or very rarely. Functional Ability and Level of Safety:   Hearing Loss  Hearing is good. Activities of Daily Living  The home contains: no safety equipment. Patient does total self care    Fall Risk  Fall Risk Assessment, last 12 mths 5/22/2019   Able to walk? Yes   Fall in past 12 months? No   Fall with injury? -   Number of falls in past 12 months -   Fall Risk Score -       Abuse Screen  Patient is not abused    Cognitive Screening   Evaluation of Cognitive Function:  Has your family/caregiver stated any concerns about your memory: no  Normal    Patient Care Team   Patient Care Team:  Alyssa Mckeon MD as PCP - General (Family Practice)  Josesito Beckford MD (Orthopedic Surgery)    Assessment/Plan   Education and counseling provided:  Are appropriate based on today's review and evaluation  End-of-Life planning (with patient's consent)    Diagnoses and all orders for this visit:    1. Medicare annual wellness visit, subsequent    2. Screening for depression  -     DEPRESSION SCREEN ANNUAL    3.  Screening for alcoholism  -     ID ANNUAL ALCOHOL SCREEN 15 MIN

## 2019-05-27 NOTE — PATIENT INSTRUCTIONS
Medicare Wellness Visit, Female The best way to live healthy is to have a lifestyle where you eat a well-balanced diet, exercise regularly, limit alcohol use, and quit all forms of tobacco/nicotine, if applicable. Regular preventive services are another way to keep healthy. Preventive services (vaccines, screening tests, monitoring & exams) can help personalize your care plan, which helps you manage your own care. Screening tests can find health problems at the earliest stages, when they are easiest to treat. [facility] follows the current, evidence-based guidelines published by the [facility] Damon ([facility]) when recommending preventive services for our patients. Because we follow these guidelines, sometimes recommendations change over time as research supports it. (For example, mammograms used to be recommended annually. Even though Medicare will still pay for an annual mammogram, the newer guidelines recommend a mammogram every two years for women of average risk.) Of course, you and your doctor may decide to screen more often for some diseases, based on your risk and your health status. Preventive services for you include: - Medicare offers their members a free annual wellness visit, which is time for you and your primary care provider to discuss and plan for your preventive service needs. Take advantage of this benefit every year! 
-All adults over the age of 72 should receive the recommended pneumonia vaccines. Current USPSTF guidelines recommend a series of two vaccines for the best pneumonia protection.  
-All adults should have a flu vaccine yearly and a tetanus vaccine every 10 years. All adults age 61 and older should receive a shingles vaccine once in their lifetime.   
-A bone mass density test is recommended when a woman turns 65 to screen for osteoporosis. This test is only recommended one time, as a screening. Some providers will use this same test as a disease monitoring tool if you already have osteoporosis. -All adults age 38-68 who are overweight should have a diabetes screening test once every three years.  
-Other screening tests and preventive services for persons with diabetes include: an eye exam to screen for diabetic retinopathy, a kidney function test, a foot exam, and stricter control over your cholesterol.  
-Cardiovascular screening for adults with routine risk involves an electrocardiogram (ECG) at intervals determined by your doctor.  
-Colorectal cancer screenings should be done for adults age 54-65 with no increased risk factors for colorectal cancer. There are a number of acceptable methods of screening for this type of cancer. Each test has its own benefits and drawbacks. Discuss with your doctor what is most appropriate for you during your annual wellness visit. The different tests include: colonoscopy (considered the best screening method), a fecal occult blood test, a fecal DNA test, and sigmoidoscopy. -Breast cancer screenings are recommended every other year for women of normal risk, age 54-69. 
-Cervical cancer screenings for women over age 72 are only recommended with certain risk factors.  
-All adults born between [facility] should be screened once for Hepatitis C. Here is a list of your current Health Maintenance items (your personalized list of preventive services) with a due date:

## 2019-05-31 LAB
EST. AVERAGE GLUCOSE BLD GHB EST-MCNC: 143 MG/DL
HBA1C MFR BLD: 6.6 % (ref 4.8–5.6)
SPECIMEN STATUS REPORT, ROLRST: NORMAL

## 2019-09-25 ENCOUNTER — OFFICE VISIT (OUTPATIENT)
Dept: FAMILY MEDICINE CLINIC | Age: 69
End: 2019-09-25

## 2019-09-25 VITALS
HEART RATE: 70 BPM | TEMPERATURE: 97.9 F | HEIGHT: 61 IN | RESPIRATION RATE: 16 BRPM | WEIGHT: 168 LBS | OXYGEN SATURATION: 98 % | BODY MASS INDEX: 31.72 KG/M2 | DIASTOLIC BLOOD PRESSURE: 91 MMHG | SYSTOLIC BLOOD PRESSURE: 163 MMHG

## 2019-09-25 DIAGNOSIS — J00 ACUTE RHINITIS: ICD-10-CM

## 2019-09-25 DIAGNOSIS — F41.8 DEPRESSION WITH ANXIETY: ICD-10-CM

## 2019-09-25 DIAGNOSIS — M54.50 ACUTE RIGHT-SIDED LOW BACK PAIN WITHOUT SCIATICA: ICD-10-CM

## 2019-09-25 DIAGNOSIS — E11.9 CONTROLLED TYPE 2 DIABETES MELLITUS WITHOUT COMPLICATION, WITHOUT LONG-TERM CURRENT USE OF INSULIN (HCC): Primary | ICD-10-CM

## 2019-09-25 DIAGNOSIS — I10 ESSENTIAL HYPERTENSION: ICD-10-CM

## 2019-09-25 RX ORDER — FLUTICASONE PROPIONATE 50 MCG
2 SPRAY, SUSPENSION (ML) NASAL DAILY
Qty: 1 BOTTLE | Refills: 2 | Status: SHIPPED | OUTPATIENT
Start: 2019-09-25 | End: 2019-12-19

## 2019-09-25 RX ORDER — METHOCARBAMOL 500 MG/1
500 TABLET, FILM COATED ORAL
Qty: 30 TAB | Refills: 0 | Status: SHIPPED | OUTPATIENT
Start: 2019-09-25

## 2019-09-25 RX ORDER — DICLOFENAC SODIUM 50 MG/1
50 TABLET, DELAYED RELEASE ORAL
Qty: 30 TAB | Refills: 0 | Status: SHIPPED | OUTPATIENT
Start: 2019-09-25 | End: 2019-10-09 | Stop reason: SDUPTHER

## 2019-09-25 NOTE — PROGRESS NOTES
1. Have you been to the ER, urgent care clinic, or been hospitalized since your last visit? No     2. Have you seen or consulted any other health care providers outside of the [address] [place] [address] since your last visit? No     Reviewed record in preparation for visit and have necessary documentation  Goals that were addressed and/or need to be completed during or after this appointment include   Health Maintenance Due   Topic Date Due    Hepatitis C Screening  1950    FOOT EXAM Q1  06/14/1960    MICROALBUMIN Q1  06/14/1960    EYE EXAM RETINAL OR DILATED  06/14/1960    Shingrix Vaccine Age 50> (1 of 2) 06/14/2000    GLAUCOMA SCREENING Q2Y  06/14/2015    Bone Densitometry (Dexa) Screening  06/14/2015    Pneumococcal 65+ years (1 of 2 - PCV13) 06/14/2015    FOBT Q 1 YEAR AGE 50-75  07/16/2015    BREAST CANCER SCRN MAMMOGRAM  07/16/2016    Influenza Age 5 to Adult  08/01/2019    LIPID PANEL Q1  09/25/2019       Patient is accompanied by self I have received verbal consent from Eliseo Osborne to discuss any/all medical information while they are present in the room.

## 2019-09-26 LAB
ALBUMIN SERPL-MCNC: 4.5 G/DL (ref 3.6–4.8)
ALBUMIN/GLOB SERPL: 1.4 {RATIO} (ref 1.2–2.2)
ALP SERPL-CCNC: 111 IU/L (ref 39–117)
ALT SERPL-CCNC: 22 IU/L (ref 0–32)
AST SERPL-CCNC: 28 IU/L (ref 0–40)
BILIRUB SERPL-MCNC: 0.4 MG/DL (ref 0–1.2)
BUN SERPL-MCNC: 10 MG/DL (ref 8–27)
BUN/CREAT SERPL: 7 (ref 12–28)
CALCIUM SERPL-MCNC: 9.6 MG/DL (ref 8.7–10.3)
CHLORIDE SERPL-SCNC: 103 MMOL/L (ref 96–106)
CHOLEST SERPL-MCNC: 174 MG/DL (ref 100–199)
CO2 SERPL-SCNC: 22 MMOL/L (ref 20–29)
CREAT SERPL-MCNC: 1.41 MG/DL (ref 0.57–1)
EST. AVERAGE GLUCOSE BLD GHB EST-MCNC: 140 MG/DL
GLOBULIN SER CALC-MCNC: 3.3 G/DL (ref 1.5–4.5)
GLUCOSE SERPL-MCNC: 99 MG/DL (ref 65–99)
HBA1C MFR BLD: 6.5 % (ref 4.8–5.6)
HDLC SERPL-MCNC: 59 MG/DL
LDLC SERPL CALC-MCNC: 96 MG/DL (ref 0–99)
POTASSIUM SERPL-SCNC: 4.5 MMOL/L (ref 3.5–5.2)
PROT SERPL-MCNC: 7.8 G/DL (ref 6–8.5)
SODIUM SERPL-SCNC: 142 MMOL/L (ref 134–144)
TRIGL SERPL-MCNC: 97 MG/DL (ref 0–149)
TSH SERPL DL<=0.005 MIU/L-ACNC: 1.2 UIU/ML (ref 0.45–4.5)
VLDLC SERPL CALC-MCNC: 19 MG/DL (ref 5–40)

## 2019-09-27 NOTE — PATIENT INSTRUCTIONS
Back Care and Preventing Injuries: Care Instructions  Your Care Instructions    You can hurt your back doing many everyday activities: lifting a heavy box, bending down to garden, exercising at the gym, and even getting out of bed. But you can keep your back strong and healthy by doing some exercises. You also can follow a few tips for sitting, sleeping, and lifting to avoid hurting your back again. Talk to your doctor before you start an exercise program. Ask for help if you want to learn more about keeping your back healthy. Follow-up care is a key part of your treatment and safety. Be sure to make and go to all appointments, and call your doctor if you are having problems. It's also a good idea to know your test results and keep a list of the medicines you take. How can you care for yourself at home? · Stay at a healthy weight to avoid strain on your lower back. · Do not smoke. Smoking increases the risk of osteoporosis, which weakens the spine. If you need help quitting, talk to your doctor about stop-smoking programs and medicines. These can increase your chances of quitting for good. · Make sure you sleep in a position that maintains your back's normal curves and on a mattress that feels comfortable. Sleep on your side with a pillow between your knees, or sleep on your back with a pillow under your knees. These positions can reduce strain on your back. · When you get out of bed, lie on your side and bend both knees. Drop your feet over the edge of the bed as you push up with both arms. Scoot to the edge of the bed. Make sure your feet are in line with your rear end (buttocks), and then stand up. · If you must stand for a long time, put one foot on a stool, ledge, or box. Exercise to strengthen your back and other muscles  · Get at least 30 minutes of exercise on most days of the week. Walking is a good choice.  You also may want to do other activities, such as running, swimming, cycling, or playing tennis or team sports. · Stretch your back muscles. Here are few exercises to try:  ? Lie on your back with your knees bent and your feet flat on the floor. Gently pull one bent knee to your chest. Put that foot back on the floor, and then pull the other knee to your chest. Hold for 15 to 30 seconds. Repeat 2 to 4 times. ? Do pelvic tilts. Lie on your back with your knees bent. Tighten your stomach muscles. Pull your belly button (navel) in and up toward your ribs. You should feel like your back is pressing to the floor and your hips and pelvis are slightly lifting off the floor. Hold for 6 seconds while breathing smoothly. · Keep your core muscles strong. The muscles of your back, belly (abdomen), and buttocks support your spine. ? Pull in your belly, and imagine pulling your navel toward your spine. Hold this for 6 seconds, then relax. Remember to keep breathing normally as you tense your muscles. ? Do curl-ups. Always do them with your knees bent. Keep your low back on the floor, and curl your shoulders toward your knees using a smooth, slow motion. Keep your arms folded across your chest. If this bothers your neck, try putting your hands behind your neck (not your head), with your elbows spread apart. ? Lie on your back with your knees bent and your feet flat on the floor. Tighten your belly muscles, and then push with your feet and raise your buttocks up a few inches. Hold this position 6 seconds as you continue to breathe normally, then lower yourself slowly to the floor. Repeat 8 to 12 times. ? If you like group exercise, try Pilates or yoga. These classes have poses that strengthen the core muscles. Protect your back when you sit  · Place a small pillow, a rolled-up towel, or a lumbar roll in the curve of your back if you need extra support. · Sit in a chair that is low enough to let you place both feet flat on the floor with both knees nearly level with your hips.  If your chair or desk is too high, use a foot rest to raise your knees. · When driving, keep your knees nearly level with your hips. Sit straight, and drive with both hands on the steering wheel. Your arms should be in a slightly bent position. · Try a kneeling chair, which helps tilt your hips forward. This takes pressure off your lower back. · Try sitting on an exercise ball. It can rock from side to side, which helps keep your back loose. Lift properly  · Squat down, bending at the hips and knees only. If you need to, put one knee to the floor and extend your other knee in front of you, bent at a right angle (half kneeling). · Press your chest straight forward. This helps keep your upper back straight while keeping a slight arch in your low back. · Hold the load as close to your body as possible, at the level of your navel. · Use your feet to change direction, taking small steps. · Lead with your hips as you change direction. Keep your shoulders in line with your hips as you move. Do not twist your body. · Set down your load carefully, squatting with your knees and hips only. When should you call for help? Watch closely for changes in your health, and be sure to contact your doctor if you have any problems. Where can you learn more? Go to http://kavin-skye.info/. Enter S810 in the search box to learn more about \"Back Care and Preventing Injuries: Care Instructions. \"  Current as of: June 26, 2019  Content Version: 12.2  © 8730-7808 [employer]. Care instructions adapted under license by [employer] (which disclaims liability or warranty for this information). If you have questions about a medical condition or this instruction, always ask your healthcare professional. [place] [address] any warranty or liability for your use of this information.

## 2019-09-27 NOTE — PROGRESS NOTES
Patient: Umesh Valerio MRN: [id number]  SSN: [id number]    YOB: 1950  Age: 71 y.o. Sex: female        Subjective:     Chief Complaint   Patient presents with    Hypertension    Back Pain     unable to lift legs at some times, pinching pain        HPI: she is a 71y.o. year old female who presents for follow up of her chronic medical conditions. She complains of LBP sans acute injury. Patient with hx of T2D, HTN, GERD and psychiatric problems. Says mood is stable. Patient denies HA, dizziness, SOB, CP, abdominal pain, dysuria, acute myalgias or arthralgias. Patient is due for lab work. Patient does not have other complaints and/or concerns. Encounter Diagnoses   Name Primary?  Controlled type 2 diabetes mellitus without complication, without long-term current use of insulin (HCC) Yes    Essential hypertension     Acute right-sided low back pain without sciatica     Acute rhinitis     Depression with anxiety        Diabetes: This patient is being treating under a comprehensive plan of care for diabetes. Overall the patient feels well with good energy level. Insulin dependence: no   Pertinent Labs:   Lab Results   Component Value Date/Time    Hemoglobin A1c 6.5 (H) 2019 12:26 PM      Body mass index is 31.74 kg/m². Lab Results   Component Value Date/Time    LDL, calculated 96 2019 12:26 PM          Wt Readings from Last 3 Encounters:   19 168 lb (76.2 kg)   19 170 lb (77.1 kg)   18 152 lb 6.4 oz (69.1 kg)        Social History     Tobacco Use   Smoking Status Former Smoker    Packs/day: 0.25    Years: 30.00    Pack years: 7.50    Last attempt to quit: 2018    Years since quittin.0   Smokeless Tobacco Never Used        Medications, diet and exercise as means of diabetic control with a goal of an A1C of less than 7.0% discussed. Diabetic foot care and annual eye exam discussed as well.     Check blood sugars while fasting just before breakfast on most days and occasionally before dinner. Write down readings in a diabetic log book and bring them to the next visit. Call the office for fasting sugars over 200 or below 75 on two or more occasions. Call immediately if having symptoms of high sugar (frequent urination, always thirsty) or low sugar (dizzy, lethargic, sweaty, nauseated, headache). Our overall goal is to reduce or eliminate the long term consequences of poorly controlled diabetes. Patient expresses understanding and agreement with our plan of care. Hypertension:  The patient reports:  taking medications as instructed, no medication side effects noted, no TIA's, no chest pain on exertion, no dyspnea on exertion, no swelling of ankles. Lifestyle modification/social history: sedentary  BP Readings from Last 3 Encounters:   09/25/19 (!) 163/91   05/22/19 157/81   09/25/18 178/78     Lab Results   Component Value Date/Time    Sodium 142 09/25/2019 12:26 PM    Potassium 4.5 09/25/2019 12:26 PM    Chloride 103 09/25/2019 12:26 PM    CO2 22 09/25/2019 12:26 PM    Anion gap 11 09/13/2010 10:47 AM    Glucose 99 09/25/2019 12:26 PM    BUN 10 09/25/2019 12:26 PM    Creatinine 1.41 (H) 09/25/2019 12:26 PM    BUN/Creatinine ratio 7 (L) 09/25/2019 12:26 PM    GFR est AA 44 (L) 09/25/2019 12:26 PM    GFR est non-AA 38 (L) 09/25/2019 12:26 PM    Calcium 9.6 09/25/2019 12:26 PM     Patient advised to log blood pressures at home 2-3 times weekly and bring to next visit. Call office as soon as possible if BP's over 140/90 on multiple occasions or with symptoms of dizziness, chest pain, shortness of breath, headache or ankle swelling. Our goal is to normalize the blood pressure to decrease the risks of strokes and heart attacks. The patient is in agreement with the plan.       Current and past medical information:    Current Medications after this visit[de-identified]     Current Outpatient Medications   Medication Sig    fluticasone propionate (FLONASE) 50 mcg/actuation nasal spray 2 Sprays by Both Nostrils route daily.  diclofenac EC (VOLTAREN) 50 mg EC tablet Take 1 Tab by mouth two (2) times daily as needed for Pain.  methocarbamol (ROBAXIN) 500 mg tablet Take 1 Tab by mouth nightly as needed for Pain.  buPROPion SR (WELLBUTRIN SR) 100 mg SR tablet Take 1 Tab by mouth daily.  famotidine (PEPCID) 40 mg tablet Take 1 Tab by mouth daily.  lisinopril-hydroCHLOROthiazide (PRINZIDE, ZESTORETIC) 20-12.5 mg per tablet Take 2 Tabs by mouth daily.  amLODIPine (NORVASC) 5 mg tablet Take 1 Tab by mouth daily.  QUEtiapine (SEROQUEL) 200 mg tablet Take 1 tablet by mouth in the morning and take 1  tabs by mouth at bedtime.  acetaminophen (TYLENOL ARTHRITIS PAIN) 650 mg CR tablet Take 1 Tab by mouth every eight (8) hours as needed for Pain. No current facility-administered medications for this visit. Patient Active Problem List    Diagnosis Date Noted    Knee pain 2013    Tobacco abuse 2012    Essential hypertension, benign 2010    Depression 2010       Past Medical History:   Diagnosis Date    Depression     Hypertension        Allergies   Allergen Reactions    Aspirin Nausea Only       History reviewed. No pertinent surgical history.     Social History     Socioeconomic History    Marital status:      Spouse name: Not on file    Number of children: Not on file    Years of education: Not on file    Highest education level: Not on file   Tobacco Use    Smoking status: Former Smoker     Packs/day: 0.25     Years: 30.00     Pack years: 7.50     Last attempt to quit: 2018     Years since quittin.0    Smokeless tobacco: Never Used   Substance and Sexual Activity    Alcohol use: No    Drug use: No         Objective:     Review of Systems:  Constitutional: Negative for fatigue or malaise  Derm: Negative for rash or lesion  HEENT: Negative for acute hearing or vision changes  Cardiovascular: Negative for dizziness, chest pain or palpitations  Respiratory: Negative for cough, wheezing or SOB  Gastrointestinal: Negative for nausea or abdominal pain  Genital/urinary: Negative for dysuria or voiding dysfunction  Muscoloskeletal: see HPI  Neurological: Negative for headache, weakness or paresthesia  Psychological: see HPI     Vitals:    09/25/19 1127 09/25/19 1130   BP: 181/85 (!) 163/91   Pulse: 70    Resp: 16    Temp: 97.9 °F (36.6 °C)    TempSrc: Oral    SpO2: 98%    Weight: 168 lb (76.2 kg)    Height: 5' 1\" (1.549 m)       Body mass index is 31.74 kg/m². Physical Exam:  Constitutional: well developed, well nourished, in no acute distress  Skin: warm and dry, normal tone and turgor  Head: normocephalic, atraumatic  Eyes: sclera clear, EOMI, PERRL  Neck: normal range of motion  Cardiovascular: normal S1, S2, regular rate and rhythm  Respiratory: clear to auscultation bilaterally with symmetrical effort  Back: right lumbar paraspinal muscle TTP  Abdomen: soft, BS normal  Extremities: full range of motion, antalgic gait  Neurology: normal strength and sensation  Psych: active, alert and oriented, flat affect       Assessment and orders:     Diagnoses and all orders for this visit:    1. Controlled type 2 diabetes mellitus without complication, without long-term current use of insulin ([facility])  -     LIPID PANEL  -     TSH 3RD GENERATION  -     METABOLIC PANEL, COMPREHENSIVE  -     HEMOGLOBIN A1C WITH EAG    2. Essential hypertension  -     LIPID PANEL  -     TSH 3RD GENERATION  -     METABOLIC PANEL, COMPREHENSIVE    3. Acute right-sided low back pain without sciatica  -     diclofenac EC (VOLTAREN) 50 mg EC tablet; Take 1 Tab by mouth two (2) times daily as needed for Pain.  -     methocarbamol (ROBAXIN) 500 mg tablet; Take 1 Tab by mouth nightly as needed for Pain.     4. Acute rhinitis  -     fluticasone propionate (FLONASE) 50 mcg/actuation nasal spray; 2 Sprays by Both Nostrils route daily. 5. Depression with anxiety        Plan of care:  Diagnoses were discussed in detail with patient. Medication risks/benefits/side effects discussed with patient. All of the patient's questions were addressed and answered to apparent satisfaction. The patient understands and agrees with our plan of care. The patient knows to call back if they have questions about the plan of care or if symptoms change. The patient received an After-Visit Summary which contains VS, diagnoses, orders, allergy and medication lists. Patient Care Team:  Cyn Jimenes MD as PCP - [facility])  Jing Tinajero MD (Orthopedic Surgery)    Follow-up and Dispositions    · Return in about 6 weeks (around 11/6/2019).          Future Appointments   Date Time Provider Judith Merazi   11/6/2019  1:50 PM Will Quintana MD [facility] SCHED       Signed By: Rolly Mcmillan MD     September 26, 2019

## 2019-12-13 ENCOUNTER — OFFICE VISIT (OUTPATIENT)
Dept: FAMILY MEDICINE CLINIC | Age: 69
End: 2019-12-13

## 2019-12-13 VITALS
WEIGHT: 169 LBS | RESPIRATION RATE: 16 BRPM | DIASTOLIC BLOOD PRESSURE: 79 MMHG | OXYGEN SATURATION: 96 % | HEIGHT: 61 IN | TEMPERATURE: 97.4 F | HEART RATE: 75 BPM | BODY MASS INDEX: 31.91 KG/M2 | SYSTOLIC BLOOD PRESSURE: 134 MMHG

## 2019-12-13 DIAGNOSIS — E11.9 CONTROLLED TYPE 2 DIABETES MELLITUS WITHOUT COMPLICATION, WITHOUT LONG-TERM CURRENT USE OF INSULIN (HCC): ICD-10-CM

## 2019-12-13 DIAGNOSIS — M25.561 ACUTE PAIN OF RIGHT KNEE: Primary | ICD-10-CM

## 2019-12-13 DIAGNOSIS — I10 ESSENTIAL HYPERTENSION: ICD-10-CM

## 2019-12-13 RX ORDER — NABUMETONE 500 MG/1
500 TABLET, FILM COATED ORAL
Qty: 60 TAB | Refills: 0 | Status: SHIPPED | OUTPATIENT
Start: 2019-12-13 | End: 2020-01-13

## 2019-12-13 NOTE — PROGRESS NOTES
1. Have you been to the ER, urgent care clinic, or been hospitalized since your last visit? No     2. Have you seen or consulted any other health care providers outside of the [address] [place] [address] since your last visit? No      Reviewed record in preparation for visit and have necessary documentation  Goals that were addressed and/or need to be completed during or after this appointment include   Health Maintenance Due   Topic Date Due    Hepatitis C Screening  1950    FOOT EXAM Q1  06/14/1960    MICROALBUMIN Q1  06/14/1960    EYE EXAM RETINAL OR DILATED  06/14/1960    Shingrix Vaccine Age 50> (1 of 2) 06/14/2000    GLAUCOMA SCREENING Q2Y  06/14/2015    Bone Densitometry (Dexa) Screening  06/14/2015    Pneumococcal 65+ years (1 of 1 - PPSV23) 06/14/2015    FOBT Q 1 YEAR AGE 50-75  07/16/2015    BREAST CANCER SCRN MAMMOGRAM  07/16/2016    Influenza Age 5 to Adult  08/01/2019       I have received verbal consent from Jez Tyson to discuss any/all medical information while others present in the room.

## 2019-12-17 NOTE — PATIENT INSTRUCTIONS
Knee Pain or Injury: Care Instructions Your Care Instructions Injuries are a common cause of knee problems. Sudden (acute) injuries may be caused by a direct blow to the knee. They can also be caused by abnormal twisting, bending, or falling on the knee. Pain, bruising, or swelling may be severe, and may start within minutes of the injury. Overuse is another cause of knee pain. Other causes are climbing stairs, kneeling, and other activities that use the knee. Everyday wear and tear, especially as you get older, also can cause knee pain. Rest, along with home treatment, often relieves pain and allows your knee to heal. If you have a serious knee injury, you may need tests and treatment. Follow-up care is a key part of your treatment and safety. Be sure to make and go to all appointments, and call your doctor if you are having problems. It's also a good idea to know your test results and keep a list of the medicines you take. How can you care for yourself at home? · Be safe with medicines. Read and follow all instructions on the label. ? If the doctor gave you a prescription medicine for pain, take it as prescribed. ? If you are not taking a prescription pain medicine, ask your doctor if you can take an over-the-counter medicine. · Rest and protect your knee. Take a break from any activity that may cause pain. · Put ice or a cold pack on your knee for 10 to 20 minutes at a time. Put a thin cloth between the ice and your skin. · Prop up a sore knee on a pillow when you ice it or anytime you sit or lie down for the next 3 days. Try to keep it above the level of your heart. This will help reduce swelling. · If your knee is not swollen, you can put moist heat, a heating pad, or a warm cloth on your knee. · If your doctor recommends an elastic bandage, sleeve, or other type of support for your knee, wear it as directed.  
· Follow your doctor's instructions about how much weight you can put on your leg. Use a cane, crutches, or a walker as instructed. · Follow your doctor's instructions about activity during your healing process. If you can do mild exercise, slowly increase your activity. · Reach and stay at a healthy weight. Extra weight can strain the joints, especially the knees and hips, and make the pain worse. Losing even a few pounds may help. When should you call for help? Call 911 anytime you think you may need emergency care. For example, call if: 
  · You have symptoms of a blood clot in your lung (called a pulmonary embolism). These may include: 
? Sudden chest pain. ? Trouble breathing. ? Coughing up blood.  
 Call your doctor now or seek immediate medical care if: 
  · You have severe or increasing pain.  
  · Your leg or foot turns cold or changes color.  
  · You cannot stand or put weight on your knee.  
  · Your knee looks twisted or bent out of shape.  
  · You cannot move your knee.  
  · You have signs of infection, such as: 
? Increased pain, swelling, warmth, or redness. ? Red streaks leading from the knee. ? Pus draining from a place on your knee. ? A fever.  
  · You have signs of a blood clot in your leg (called a deep vein thrombosis), such as: 
? Pain in your calf, back of the knee, thigh, or groin. ? Redness and swelling in your leg or groin.  
 Watch closely for changes in your health, and be sure to contact your doctor if: 
  · You have tingling, weakness, or numbness in your knee.  
  · You have any new symptoms, such as swelling.  
  · You have bruises from a knee injury that last longer than 2 weeks.  
  · You do not get better as expected. Where can you learn more? Go to http://kavin-skye.info/. Enter K195 in the search box to learn more about \"Knee Pain or Injury: Care Instructions. \" Current as of: June 26, 2019 Content Version: 12.2 © 9750-9791 [employer].  Care instructions adapted under license by [address] (which disclaims liability or warranty for this information). If you have questions about a medical condition or this instruction, always ask your healthcare professional. [address] any warranty or liability for your use of this information.

## 2019-12-17 NOTE — PROGRESS NOTES
Patient: Radhika Laboy MRN: [id number]  SSN: [id number]    YOB: 1950  Age: 71 y.o. Sex: female      Chief Complaint   Patient presents with    Hypertension    Knee Pain     right knee     Radhika Laboy is a 71 y.o. female who present for follow up of HTN. Today she complains of  right knee pain and edema sans acute injury. She had complained of LBP at last OV 6 weeks ago. BP elevated at that time. Patient with hx of T2D, HTN, GERD and psychiatric problems. Says mood is stable. T2D has been well controlled. Patient denies HA, dizziness, SOB, CP, abdominal pain, dysuria, weakness or paresthesia. BP Readings from Last 3 Encounters:   12/13/19 134/79   09/25/19 (!) 163/91   05/22/19 157/81     Wt Readings from Last 3 Encounters:   12/13/19 169 lb (76.7 kg)   09/25/19 168 lb (76.2 kg)   05/22/19 170 lb (77.1 kg)     Body mass index is 31.93 kg/m². Lab Results   Component Value Date/Time    Hemoglobin A1c 6.5 (H) 09/25/2019 12:26 PM        Medications:     Current Outpatient Medications   Medication Sig    nabumetone (RELAFEN) 500 mg tablet Take 1 Tab by mouth two (2) times daily as needed for Pain. Indications: pain    amLODIPine (NORVASC) 5 mg tablet TAKE 1 TABLET BY MOUTH EVERY DAY    famotidine (PEPCID) 40 mg tablet TAKE 1 TABLET BY MOUTH EVERY DAY    QUEtiapine (SEROQUEL) 200 mg tablet TAKE 1 TABLET BY MOUTH IN THE MORNING AND TAKE 1 TABS BY MOUTH AT BEDTIME.  lisinopril-hydroCHLOROthiazide (PRINZIDE, ZESTORETIC) 20-12.5 mg per tablet TAKE 2 TABLETS BY MOUTH EVERY DAY    fluticasone propionate (FLONASE) 50 mcg/actuation nasal spray 2 Sprays by Both Nostrils route daily.  methocarbamol (ROBAXIN) 500 mg tablet Take 1 Tab by mouth nightly as needed for Pain.  buPROPion SR (WELLBUTRIN SR) 100 mg SR tablet Take 1 Tab by mouth daily.  acetaminophen (TYLENOL ARTHRITIS PAIN) 650 mg CR tablet Take 1 Tab by mouth every eight (8) hours as needed for Pain.      No current facility-administered medications for this visit. Problem List:     Patient Active Problem List    Diagnosis Date Noted    Knee pain 2013    Tobacco abuse 2012    Essential hypertension, benign 2010    Depression 2010       Medical History:     Past Medical History:   Diagnosis Date    Depression     Hypertension        Allergies: Allergies   Allergen Reactions    Aspirin Nausea Only       Surgical History:   History reviewed. No pertinent surgical history.     Social History:     Social History     Socioeconomic History    Marital status:      Spouse name: Not on file    Number of children: Not on file    Years of education: Not on file    Highest education level: Not on file   Tobacco Use    Smoking status: Former Smoker     Packs/day: 0.25     Years: 30.00     Pack years: 7.50     Last attempt to quit: 2018     Years since quittin.2    Smokeless tobacco: Never Used   Substance and Sexual Activity    Alcohol use: No    Drug use: No       Review of Symptoms:  Constitutional: Negative for fever, chills, fatigue, malaise  Skin: Negative for rash or lesion  CV: Negative for chest pain or palpitations  Resp: Negative for cough, wheezing or SOB  Gastrointestinal: Negative for nausea or abdominal pain  Musculoskeletal: see HPI  Neurological: Negative for weakness or paresthesia      Vitals:    19 1015 19 1017   BP: 147/80 134/79   Pulse: 75    Resp: 16    Temp: 97.4 °F (36.3 °C)    TempSrc: Oral    SpO2: 96%    Weight: 169 lb (76.7 kg)    Height: 5' 1\" (1.549 m)        Physical Examination:  General: Well developed, well nourished, in no acute distress  Skin: Warm and dry, no rash or lesion appreciated  Head: Normocephalic, atraumatic  Eyes: Sclera clear, EOMI  Neck: Normal range of motion  Respiratory: symmetrical, unlabored effort  Cardiovascular:  Regular rate and rhythm  Extremities: Right nee exam: slight swelling, anterior tenderness, no instability; ligaments intact, FROM, antalgic gait. Neurological: Normal strength and sensation. No focal deficits  Psychological: Active, alert and oriented. Affect appropriate     X-ray: No significant findings identified by myself. Radiologist reports: Possible minimal patellofemoral osteoarthritis    Diagnoses and all orders for this visit:    1. Acute pain of right knee  -     XR KNEE RT MAX 2 VWS; Future  -     nabumetone (RELAFEN) 500 mg tablet; Take 1 Tab by mouth two (2) times daily as needed for Pain. Indications: pain    2. Essential hypertension    3. Controlled type 2 diabetes mellitus without complication, without long-term current use of insulin ([facility] [address]        Plan of care:  Diagnoses were discussed in detail with patient. Rest the injured area as much as practical, apply ice packs, elevate the injured limb, compressive bandage  Discussed expected course, resolution and possible complications of diagnosis in detail with patient. Goals of treatment  were discussed. Medication risks/benefits/side effects discussed with patient. Continue current prescribed medications for HTN and T2D as written. No medication changes at this time. All of the patient's questions were addressed and answered to apparent satisfaction. The patient understands and agrees with our plan of care. The patient knows to call back if they have questions about the plan of care or if symptoms change. The patient received an After-Visit Summary which contains VS, diagnoses, orders, allergy and medication lists. No future appointments. Follow-up and Dispositions    · Return in about 3 months (around 3/13/2020), or if symptoms worsen or fail to improve.

## 2020-04-10 DIAGNOSIS — F20.3 UNDIFFERENTIATED SCHIZOPHRENIA (HCC): ICD-10-CM

## 2020-04-10 DIAGNOSIS — F41.8 DEPRESSION WITH ANXIETY: ICD-10-CM

## 2020-04-10 DIAGNOSIS — I10 ESSENTIAL HYPERTENSION: ICD-10-CM

## 2020-04-13 RX ORDER — QUETIAPINE FUMARATE 200 MG/1
200 TABLET, FILM COATED ORAL
Qty: 180 TAB | Refills: 0 | Status: SHIPPED | OUTPATIENT
Start: 2020-04-13 | End: 2020-07-10

## 2020-04-13 RX ORDER — LISINOPRIL AND HYDROCHLOROTHIAZIDE 12.5; 2 MG/1; MG/1
TABLET ORAL
Qty: 180 TAB | Refills: 0 | Status: SHIPPED | OUTPATIENT
Start: 2020-04-13 | End: 2020-07-10

## 2020-07-14 ENCOUNTER — VIRTUAL VISIT (OUTPATIENT)
Dept: FAMILY MEDICINE CLINIC | Age: 70
End: 2020-07-14

## 2020-07-14 DIAGNOSIS — F20.3 UNDIFFERENTIATED SCHIZOPHRENIA (HCC): ICD-10-CM

## 2020-07-14 DIAGNOSIS — I10 ESSENTIAL HYPERTENSION: ICD-10-CM

## 2020-07-14 DIAGNOSIS — F41.8 DEPRESSION WITH ANXIETY: ICD-10-CM

## 2020-07-14 DIAGNOSIS — Z13.39 SCREENING FOR ALCOHOLISM: ICD-10-CM

## 2020-07-14 DIAGNOSIS — Z13.31 SCREENING FOR DEPRESSION: ICD-10-CM

## 2020-07-14 DIAGNOSIS — E11.9 CONTROLLED TYPE 2 DIABETES MELLITUS WITHOUT COMPLICATION, WITHOUT LONG-TERM CURRENT USE OF INSULIN (HCC): Primary | ICD-10-CM

## 2020-07-14 DIAGNOSIS — Z00.00 MEDICARE ANNUAL WELLNESS VISIT, SUBSEQUENT: ICD-10-CM

## 2020-07-14 NOTE — PROGRESS NOTES
1. Have you been to the ER, urgent care clinic since your last visit? Hospitalized since your last visit? No    2. Have you seen or consulted any other health care providers outside of the [facility] since your last visit? Include any pap smears or colon screening.  No  Reviewed record in preparation for visit and have necessary documentation  Goals that were addressed and/or need to be completed during or after this appointment include    Health Maintenance Due   Topic Date Due    Hepatitis C Screening  1950    Foot Exam Q1  06/14/1960    MICROALBUMIN Q1  06/14/1960    Eye Exam Retinal or Dilated  06/14/1960    Shingrix Vaccine Age 50> (1 of 2) 06/14/2000    GLAUCOMA SCREENING Q2Y  06/14/2015    Bone Densitometry (Dexa) Screening  06/14/2015    Pneumococcal 65+ years (1 of 1 - PPSV23) 06/14/2015    FOBT Q1Y Age 50-75  07/16/2015    Breast Cancer Screen Mammogram  07/16/2016    Medicare Yearly Exam  05/22/2020

## 2020-07-17 DIAGNOSIS — I10 ESSENTIAL HYPERTENSION: ICD-10-CM

## 2020-07-17 DIAGNOSIS — F20.3 UNDIFFERENTIATED SCHIZOPHRENIA (HCC): ICD-10-CM

## 2020-07-17 DIAGNOSIS — F41.8 DEPRESSION WITH ANXIETY: ICD-10-CM

## 2020-07-17 RX ORDER — LISINOPRIL AND HYDROCHLOROTHIAZIDE 12.5; 2 MG/1; MG/1
TABLET ORAL
Qty: 180 TAB | Refills: 0 | Status: CANCELLED | OUTPATIENT
Start: 2020-07-17

## 2020-07-17 RX ORDER — QUETIAPINE FUMARATE 200 MG/1
200 TABLET, FILM COATED ORAL
Qty: 180 TAB | Refills: 0 | Status: CANCELLED | OUTPATIENT
Start: 2020-07-17

## 2020-07-20 NOTE — PATIENT INSTRUCTIONS
Medicare Wellness Visit, Female     The best way to live healthy is to have a lifestyle where you eat a well-balanced diet, exercise regularly, limit alcohol use, and quit all forms of tobacco/nicotine, if applicable. Regular preventive services are another way to keep healthy. Preventive services (vaccines, screening tests, monitoring & exams) can help personalize your care plan, which helps you manage your own care. Screening tests can find health problems at the earliest stages, when they are easiest to treat. [facility] follows the current, evidence-based guidelines published by the [facility] ([facility]) when recommending preventive services for our patients. Because we follow these guidelines, sometimes recommendations change over time as research supports it. (For example, mammograms used to be recommended annually. Even though Medicare will still pay for an annual mammogram, the newer guidelines recommend a mammogram every two years for women of average risk). Of course, you and your doctor may decide to screen more often for some diseases, based on your risk and your co-morbidities (chronic disease you are already diagnosed with). Preventive services for you include:  - Medicare offers their members a free annual wellness visit, which is time for you and your primary care provider to discuss and plan for your preventive service needs. Take advantage of this benefit every year!  -All adults over the age of 72 should receive the recommended pneumonia vaccines. Current USPSTF guidelines recommend a series of two vaccines for the best pneumonia protection.   -All adults should have a flu vaccine yearly and a tetanus vaccine every 10 years.   -All adults age 48 and older should receive the shingles vaccines (series of two vaccines).       -All adults age 38-68 who are overweight should have a diabetes screening test once every three years.   -All adults born between [birth year] and 1965 should be screened once for Hepatitis C.  -Other screening tests and preventive services for persons with diabetes include: an eye exam to screen for diabetic retinopathy, a kidney function test, a foot exam, and stricter control over your cholesterol.   -Cardiovascular screening for adults with routine risk involves an electrocardiogram (ECG) at intervals determined by your doctor.   -Colorectal cancer screenings should be done for adults age 54-65 with no increased risk factors for colorectal cancer. There are a number of acceptable methods of screening for this type of cancer. Each test has its own benefits and drawbacks. Discuss with your doctor what is most appropriate for you during your annual wellness visit. The different tests include: colonoscopy (considered the best screening method), a fecal occult blood test, a fecal DNA test, and sigmoidoscopy.    -A bone mass density test is recommended when a woman turns 65 to screen for osteoporosis. This test is only recommended one time, as a screening. Some providers will use this same test as a disease monitoring tool if you already have osteoporosis. -Breast cancer screenings are recommended every other year for women of normal risk, age 54-69.  -Cervical cancer screenings for women over age 72 are only recommended with certain risk factors.      Here is a list of your current Health Maintenance items (your personalized list of preventive services) with a due date:  Health Maintenance Due   Topic Date Due    Hepatitis C Test  1950    Diabetic Foot Care  06/14/1960    Albumin Urine Test  06/14/1960    Eye Exam  06/14/1960    Shingles Vaccine (1 of 2) 06/14/2000    Glaucoma Screening   06/14/2015    Bone Mineral Density   06/14/2015    Pneumococcal Vaccine (1 of 1 - PPSV23) 06/14/2015    Colon Cancer Stool Test  07/16/2015    Mammogram  07/16/2016    Annual Well Visit  07/14/2020

## 2020-07-20 NOTE — PROGRESS NOTES
Dustin Ambriz is a 79 y.o. female evaluated via telephone on 20. Patient Identity confirmed by . Telephone encounter done in lieu of office visit due to extraordinary circumstances. A state of national and state emergency has been declared by the President and the [place] due to the [employer] pandemic. Pursuant to the emergency declaration under the [facility] [place] [address] [facility] authority and the [employer] and [employer] Act, this Virtual  Visit was conducted, with patient's consent, to reduce the patient's risk of exposure to COVID-19 and provide continuity of care for an established patient. Otilia Harrell LPN coordinated virtual visit    Consent:  Patient and/or health care decision maker is aware that he/she may receive a bill for this telephone encounter, depending on his insurance coverage, and has provided verbal consent to proceed: NA - Consent obtained within past 12 months    Physician Location: Office  Patient Location: Home    CC: Follow up chronic health conditions  Information gathered from patient and/or health care decision maker. HPI: Dustin Ambriz is a 79 y.o. female who was evaluated by synchronous (real-time) audio technology from his/her home. Patient scheduled for follow up of her chronic medical conditions. Patient with hx of T2D, HTN, GERD and psychiatric problems. Says mood is stable. Patient denies HA, dizziness, SOB, CP, abdominal pain, dysuria, acute myalgias or arthralgias. Patient is due for lab work. Patient does not have other complaints and/or concerns.        Current Outpatient Medications:     QUEtiapine (SEROquel) 200 mg tablet, TAKE 1 TAB BY MOUTH BEFORE BREAKFAST AND DINNER., Disp: 180 Tab, Rfl: 0    lisinopril-hydroCHLOROthiazide (PRINZIDE, ZESTORETIC) 20-12.5 mg per tablet, TAKE 2 TABLETS BY MOUTH EVERY DAY, Disp: 180 Tab, Rfl: 0    fluticasone propionate (FLONASE) 50 mcg/actuation nasal spray, SPRAY 2 SPRAYS INTO EACH NOSTRIL EVERY DAY, Disp: 1 Bottle, Rfl: 2    nabumetone (RELAFEN) 500 mg tablet, TAKE 1 TABET BY MOUTH TWO (2) TIMES DAILY AS NEEDED FOR PAIN., Disp: 60 Tab, Rfl: 1    amLODIPine (NORVASC) 5 mg tablet, TAKE 1 TABLET BY MOUTH EVERY DAY, Disp: 90 Tab, Rfl: 0    famotidine (PEPCID) 40 mg tablet, TAKE 1 TABLET BY MOUTH EVERY DAY, Disp: 90 Tab, Rfl: 0    buPROPion SR (WELLBUTRIN SR) 100 mg SR tablet, Take 1 Tab by mouth daily. , Disp: 90 Tab, Rfl: 1    acetaminophen (TYLENOL ARTHRITIS PAIN) 650 mg CR tablet, Take 1 Tab by mouth every eight (8) hours as needed for Pain., Disp: 30 Tab, Rfl: 2    methocarbamol (ROBAXIN) 500 mg tablet, Take 1 Tab by mouth nightly as needed for Pain., Disp: 30 Tab, Rfl: 0     Allergies   Allergen Reactions    Aspirin Nausea Only        Patient Active Problem List    Diagnosis Date Noted    Knee pain 05/28/2013    Tobacco abuse 05/23/2012    Essential hypertension, benign 09/13/2010    Depression 09/13/2010        Review of Systems:  Constitutional: Negative for fatigue, malaise  Resp: Negative for cough, wheezing or SOB  CV: Negative for chest pain, dizziness or palpitations  GI: Negative for nausea or abdominal pain  MS: Negative for acute myalgias or arthralgias   Neuro: Negative for HA, weakness or paresthesia  Psych: see HPI      Assessment/Plan:  Details of this discussion including any medical advice provided: Patient advised as a precaution to self isolate at home, practice regular hand washing with soap and warm water and to wear a mask and utilize social distancing when necessary to be out in public places. ICD-10-CM ICD-9-CM    1.  Controlled type 2 diabetes mellitus without complication, without long-term current use of insulin ([facility])  E11.9 250.00 LIPID PANEL      METABOLIC PANEL, COMPREHENSIVE      HEMOGLOBIN A1C WITH EAG      TSH 3RD GENERATION      CBC WITH AUTOMATED DIFF      AMB POC URINE, MICROALBUMIN, SEMIQUANT (3 RESULTS)      AMB POC URINALYSIS DIP STICK AUTO W/O MICRO   2. Essential hypertension  A18 129.7 METABOLIC PANEL, COMPREHENSIVE      TSH 3RD GENERATION   3. Undifferentiated schizophrenia ([facility] [address]  F20.3 295.90    4. Depression with anxiety  F41.8 300.4        Total Time: minutes: 21-30 minutes was spent on phone discussing above problems and plan. Patient medical history, prior OV notes, vitals flow sheet, lab results, medications, and allergies were reviewed during this encounter. For phone encounters:  I affirm this is a patient initiated episode with an established Patient who has not had a related appointment within my department in the past 7 days or scheduled within the next 24 hours. Note: not billable if this call serves to triage the patient into an appointment for the relevant concern    MD OLIVER Ramirez & EITAN MAGDALENO [facility]  07/20/20     The following Annual Medicare Wellness Exam is distinct and separate from the medical evaluation and decision making. This is the Subsequent Medicare Annual Wellness Exam, performed 12 months or more after the Initial AWV or the last Subsequent AWV    I have reviewed the patient's medical history in detail and updated the computerized patient record. History     Patient Active Problem List   Diagnosis Code    Essential hypertension, benign I10    Depression F32.9    Tobacco abuse Z72.0    Knee pain M25.569     Past Medical History:   Diagnosis Date    Depression     Hypertension       No past surgical history on file.   Current Outpatient Medications   Medication Sig Dispense Refill    QUEtiapine (SEROquel) 200 mg tablet TAKE 1 TAB BY MOUTH BEFORE BREAKFAST AND DINNER. 180 Tab 0    lisinopril-hydroCHLOROthiazide (PRINZIDE, ZESTORETIC) 20-12.5 mg per tablet TAKE 2 TABLETS BY MOUTH EVERY  Tab 0    fluticasone propionate (FLONASE) 50 mcg/actuation nasal spray SPRAY 2 SPRAYS INTO EACH NOSTRIL EVERY DAY 1 Bottle 2    nabumetone (RELAFEN) 500 mg tablet TAKE 1 TABET BY MOUTH TWO (2) TIMES DAILY AS NEEDED FOR PAIN. 60 Tab 1    amLODIPine (NORVASC) 5 mg tablet TAKE 1 TABLET BY MOUTH EVERY DAY 90 Tab 0    famotidine (PEPCID) 40 mg tablet TAKE 1 TABLET BY MOUTH EVERY DAY 90 Tab 0    buPROPion SR (WELLBUTRIN SR) 100 mg SR tablet Take 1 Tab by mouth daily. 90 Tab 1    acetaminophen (TYLENOL ARTHRITIS PAIN) 650 mg CR tablet Take 1 Tab by mouth every eight (8) hours as needed for Pain. 30 Tab 2    methocarbamol (ROBAXIN) 500 mg tablet Take 1 Tab by mouth nightly as needed for Pain. 30 Tab 0     Allergies   Allergen Reactions    Aspirin Nausea Only       Family History   Problem Relation Age of Onset    Cancer Father         lung ca    Lung Disease Father     Cancer Brother     Alcohol abuse Neg Hx     Arthritis-rheumatoid Neg Hx     Asthma Neg Hx     Bleeding Prob Neg Hx     Diabetes Neg Hx     Elevated Lipids Neg Hx     Headache Neg Hx     Heart Disease Neg Hx     Hypertension Neg Hx     Migraines Neg Hx     Psychiatric Disorder Neg Hx     Stroke Neg Hx     Mental Retardation Neg Hx      Social History     Tobacco Use    Smoking status: Former Smoker     Packs/day: 0.25     Years: 30.00     Pack years: 7.50     Last attempt to quit: 2018     Years since quittin.8    Smokeless tobacco: Never Used   Substance Use Topics    Alcohol use: No       Depression Risk Factor Screening:     3 most recent PHQ Screens 2018   Little interest or pleasure in doing things Several days   Feeling down, depressed, irritable, or hopeless Several days   Total Score PHQ 2 2       Alcohol Risk Factor Screening:   Do you average 1 drink per night or more than 7 drinks a week:  No    On any one occasion in the past three months have you have had more than 3 drinks containing alcohol:  No      Functional Ability and Level of Safety:   Hearing: Hearing is good. Activities of Daily Living:   The home contains: no safety equipment. Patient does total self care     Ambulation: with no difficulty     Fall Risk:  Fall Risk Assessment, last 12 mths 5/22/2019   Able to walk? Yes   Fall in past 12 months? No   Fall with injury? -   Number of falls in past 12 months -   Fall Risk Score -     Abuse Screen:  Patient is not abused       Cognitive Screening   Has your family/caregiver stated any concerns about your memory: no      Patient Care Team   Patient Care Team:  Trina Nicole MD as PCP - General (Family Practice)  Trina Nicole MD as PCP - [facility] Provider  Mikal Crum MD (Orthopedic Surgery)    Assessment/Plan   Education and counseling provided:  Are appropriate based on today's review and evaluation  End-of-Life planning (with patient's consent)    Diagnoses and all orders for this visit:    1. Medicare annual wellness visit, subsequent    2. Screening for depression  -     DEPRESSION SCREEN ANNUAL    3. Screening for alcoholism  -     MT ANNUAL ALCOHOL SCREEN 15 MIN      Andrew Sosa, who was evaluated through a synchronous (real-time) audio only encounter, and/or her healthcare decision maker, is aware that it is a billable service, with coverage as determined by her insurance carrier. She provided verbal consent to proceed: Yes, and patient identification was verified. It was conducted pursuant to the emergency declaration under the [facility], [address] [place] [address] waiver authority and the [address] and [employer] Act. A caregiver was present when appropriate. Ability to conduct physical exam was limited. I was in the office. The patient was at home.     Gerri Kwok MD

## 2020-07-24 ENCOUNTER — HOSPITAL ENCOUNTER (OUTPATIENT)
Dept: LAB | Age: 70
Discharge: HOME | End: 2020-07-24

## 2020-07-24 DIAGNOSIS — E11.9 CONTROLLED TYPE 2 DIABETES MELLITUS WITHOUT COMPLICATION, WITHOUT LONG-TERM CURRENT USE OF INSULIN (HCC): ICD-10-CM

## 2020-07-24 DIAGNOSIS — R82.90 URINE FINDINGS ABNORMAL: ICD-10-CM

## 2020-07-24 DIAGNOSIS — I10 ESSENTIAL HYPERTENSION: ICD-10-CM

## 2020-07-24 DIAGNOSIS — E11.9 CONTROLLED TYPE 2 DIABETES MELLITUS WITHOUT COMPLICATION, WITHOUT LONG-TERM CURRENT USE OF INSULIN (HCC): Primary | ICD-10-CM

## 2020-07-24 LAB
ALBUMIN SERPL-MCNC: 3.5 G/DL (ref 3.5–5)
ALBUMIN UR QL STRIP: 30 MG/L
ALBUMIN/GLOB SERPL: 0.9 {RATIO} (ref 1.1–2.2)
ALP SERPL-CCNC: 111 U/L (ref 45–117)
ALT SERPL-CCNC: 23 U/L (ref 12–78)
ANION GAP SERPL CALC-SCNC: 8 MMOL/L (ref 5–15)
AST SERPL-CCNC: 19 U/L (ref 15–37)
BASOPHILS # BLD: 0 K/UL (ref 0–0.1)
BASOPHILS NFR BLD: 1 % (ref 0–1)
BILIRUB SERPL-MCNC: 0.3 MG/DL (ref 0.2–1)
BILIRUB UR QL STRIP: NEGATIVE
BUN SERPL-MCNC: 9 MG/DL (ref 6–20)
BUN/CREAT SERPL: 6 (ref 12–20)
CALCIUM SERPL-MCNC: 9.6 MG/DL (ref 8.5–10.1)
CHLORIDE SERPL-SCNC: 111 MMOL/L (ref 97–108)
CHOLEST SERPL-MCNC: 170 MG/DL
CO2 SERPL-SCNC: 25 MMOL/L (ref 21–32)
COMMENT, HOLDF: NORMAL
CREAT SERPL-MCNC: 1.42 MG/DL (ref 0.55–1.02)
CREATININE, URINE POC: 300 MG/DL
DIFFERENTIAL METHOD BLD: NORMAL
EOSINOPHIL # BLD: 0.2 K/UL (ref 0–0.4)
EOSINOPHIL NFR BLD: 3 % (ref 0–7)
ERYTHROCYTE [DISTWIDTH] IN BLOOD BY AUTOMATED COUNT: 14.1 % (ref 11.5–14.5)
EST. AVERAGE GLUCOSE BLD GHB EST-MCNC: 137 MG/DL
GLOBULIN SER CALC-MCNC: 4.1 G/DL (ref 2–4)
GLUCOSE SERPL-MCNC: 88 MG/DL (ref 65–100)
GLUCOSE UR-MCNC: NEGATIVE MG/DL
HBA1C MFR BLD: 6.4 % (ref 4–5.6)
HCT VFR BLD AUTO: 42.4 % (ref 35–47)
HDLC SERPL-MCNC: 64 MG/DL
HDLC SERPL: 2.7 {RATIO} (ref 0–5)
HGB BLD-MCNC: 13.8 G/DL (ref 11.5–16)
IMM GRANULOCYTES # BLD AUTO: 0 K/UL (ref 0–0.04)
IMM GRANULOCYTES NFR BLD AUTO: 0 % (ref 0–0.5)
KETONES P FAST UR STRIP-MCNC: NEGATIVE MG/DL
LDLC SERPL CALC-MCNC: 86.2 MG/DL (ref 0–100)
LIPID PROFILE,FLP: NORMAL
LYMPHOCYTES # BLD: 2.8 K/UL (ref 0.8–3.5)
LYMPHOCYTES NFR BLD: 44 % (ref 12–49)
MCH RBC QN AUTO: 31.8 PG (ref 26–34)
MCHC RBC AUTO-ENTMCNC: 32.5 G/DL (ref 30–36.5)
MCV RBC AUTO: 97.7 FL (ref 80–99)
MICROALBUMIN/CREAT RATIO POC: <30 MG/G
MONOCYTES # BLD: 0.5 K/UL (ref 0–1)
MONOCYTES NFR BLD: 8 % (ref 5–13)
NEUTS SEG # BLD: 2.9 K/UL (ref 1.8–8)
NEUTS SEG NFR BLD: 44 % (ref 32–75)
NRBC # BLD: 0 K/UL (ref 0–0.01)
NRBC BLD-RTO: 0 PER 100 WBC
PH UR STRIP: 5.5 [PH] (ref 4.6–8)
PLATELET # BLD AUTO: 306 K/UL (ref 150–400)
PMV BLD AUTO: 9.6 FL (ref 8.9–12.9)
POTASSIUM SERPL-SCNC: 4.4 MMOL/L (ref 3.5–5.1)
PROT SERPL-MCNC: 7.6 G/DL (ref 6.4–8.2)
PROT UR QL STRIP: NEGATIVE
RBC # BLD AUTO: 4.34 M/UL (ref 3.8–5.2)
SAMPLES BEING HELD,HOLD: NORMAL
SODIUM SERPL-SCNC: 144 MMOL/L (ref 136–145)
SP GR UR STRIP: 1.03 (ref 1–1.03)
TRIGL SERPL-MCNC: 99 MG/DL (ref ?–150)
TSH SERPL DL<=0.05 MIU/L-ACNC: 0.74 UIU/ML (ref 0.36–3.74)
UA UROBILINOGEN AMB POC: NORMAL (ref 0.2–1)
URINALYSIS CLARITY POC: NORMAL
URINALYSIS COLOR POC: YELLOW
URINE BLOOD POC: NORMAL
URINE LEUKOCYTES POC: NORMAL
URINE NITRITES POC: POSITIVE
VLDLC SERPL CALC-MCNC: 19.8 MG/DL
WBC # BLD AUTO: 6.4 K/UL (ref 3.6–11)

## 2020-07-29 ENCOUNTER — TELEPHONE (OUTPATIENT)
Dept: FAMILY MEDICINE CLINIC | Age: 70
End: 2020-07-29

## 2020-07-29 RX ORDER — NITROFURANTOIN (MACROCRYSTALS) 100 MG/1
100 CAPSULE ORAL 2 TIMES DAILY
Qty: 14 CAP | Refills: 0 | Status: SHIPPED | OUTPATIENT
Start: 2020-07-29 | End: 2020-08-05

## 2020-07-29 NOTE — TELEPHONE ENCOUNTER
I thought there was a urine culture. Call patient and let her know an antibiotic has been sent to pharmacy for UTI.
